# Patient Record
Sex: FEMALE | Race: WHITE | Employment: OTHER | URBAN - METROPOLITAN AREA
[De-identification: names, ages, dates, MRNs, and addresses within clinical notes are randomized per-mention and may not be internally consistent; named-entity substitution may affect disease eponyms.]

---

## 2017-10-11 PROBLEM — D50.9 MICROCYTIC ANEMIA: Status: ACTIVE | Noted: 2017-10-11

## 2017-10-11 PROBLEM — R55 SYNCOPE, CARDIOGENIC: Status: ACTIVE | Noted: 2017-10-11

## 2017-10-11 PROBLEM — I44.1 AV BLOCK, 2ND DEGREE: Status: ACTIVE | Noted: 2017-10-11

## 2018-05-02 ENCOUNTER — NURSE ONLY (OUTPATIENT)
Dept: NON INVASIVE DIAGNOSTICS | Age: 78
End: 2018-05-02
Payer: MEDICARE

## 2018-05-02 DIAGNOSIS — I44.1 AV BLOCK, 2ND DEGREE: ICD-10-CM

## 2018-05-02 DIAGNOSIS — Z95.0 PACEMAKER: Primary | Chronic | ICD-10-CM

## 2018-05-02 PROCEDURE — 93296 REM INTERROG EVL PM/IDS: CPT | Performed by: INTERNAL MEDICINE

## 2018-05-02 PROCEDURE — 93294 REM INTERROG EVL PM/LDLS PM: CPT | Performed by: INTERNAL MEDICINE

## 2018-06-01 ENCOUNTER — TELEPHONE (OUTPATIENT)
Dept: NON INVASIVE DIAGNOSTICS | Age: 78
End: 2018-06-01

## 2018-08-02 ENCOUNTER — OFFICE VISIT (OUTPATIENT)
Dept: NON INVASIVE DIAGNOSTICS | Age: 78
End: 2018-08-02
Payer: MEDICARE

## 2018-08-02 VITALS
BODY MASS INDEX: 27.7 KG/M2 | DIASTOLIC BLOOD PRESSURE: 76 MMHG | HEART RATE: 60 BPM | WEIGHT: 128.4 LBS | SYSTOLIC BLOOD PRESSURE: 114 MMHG | RESPIRATION RATE: 18 BRPM | HEIGHT: 57 IN

## 2018-08-02 DIAGNOSIS — Z95.0 PACEMAKER: Primary | Chronic | ICD-10-CM

## 2018-08-02 PROCEDURE — 99213 OFFICE O/P EST LOW 20 MIN: CPT | Performed by: INTERNAL MEDICINE

## 2018-08-02 PROCEDURE — 93280 PM DEVICE PROGR EVAL DUAL: CPT | Performed by: INTERNAL MEDICINE

## 2018-08-02 NOTE — PROGRESS NOTES
agitation. The patient is not nervous/anxious. PHYSICAL EXAM:  Vitals:    18 0955   BP: 114/76   Pulse: 60   Resp: 18   Weight: 128 lb 6.4 oz (58.2 kg)   Height: 4' 9\" (1.448 m)     Constitutional: Oriented to person, place, and time. Well-developed and cooperative. Head: Normocephalic and atraumatic. Eyes: Conjunctivae are normal.    Neck: No hepatojugular reflux and no JVD present. Carotid bruit is not present. Cardiovascular: S1 normal, S2 normal and intact distal pulses. A regular rhythm present. PMI is not displaced. Pulmonary/Chest: Effort normal and breath sounds normal. No respiratory distress. Abdominal: Soft. Normal appearance and bowel sounds are normal.   No tenderness. Musculoskeletal: Normal range of motion of all extremities, no muscle weakness. Neurological: Alert and oriented to person, place, and time. Gait normal.   Skin: Skin is warm and dry. No bruising, no ecchymosis and no rash noted. Extremity: No clubbing or cyanosis. No edema. Psychiatric: Normal mood and affect. Thought content normal.   Pacemaker site: stable, no sign of infection or erosion. Pertinent Labs:     TSH:   Lab Results   Component Value Date    TSH 1.100 10/11/2017         Xray: Xr Chest Standard (2 Vw)    Result Date: 10/10/2017  Patient MRN:  [id number] : 1940 Age: 68 years Gender: Female Order Date:  10/10/2017 5:40 PM EXAM: XR CHEST STANDARD TWO VW NUMBER OF VIEWS:  2 INDICATION:  syncope  COMPARISON: 8/10/2010 FINDINGS: The heart is mildly enlarged in size. The mediastinum is normal in width. There is a normal appearance to the pulmonary vasculature. No focal airspace opacity. There is no pleural effusion. There is no pneumothorax. Right axillary lymph node dissection     No airspace opacities or pleural effusion.  Mild stable cardiac megaly     Xr Hand Right (min 3 Views)    Result Date: 10/10/2017  Patient MRN:  [id number] : 1940 Age: 68 years Gender: Female Order Date: 10/10/2017 5:40 PM TECHNIQUE/NUMBER OF IMAGES/COMPARISON/CLINICAL HISTORY: X-ray series of the right hand, frontal, lateral and oblique projections. 3 images, 3 views. History: Trauma injury. FINDINGS: There is normal cortical bone contour and trabecular bone texture for all phalanxes of all fingers. Slight degree of soft tissue swelling is seen in the distal metacarpal area more prominent towards the radial side. All interphalangeal joints are preserved accounting some discrete degenerative changes mainly observed in the distal interphalangeal joints of the second and third fingers. All metacarpal joints are preserved, but there are minimal degenerative changes in the third metacarpal-phalangeal joint. No conspicuous acute fractures or dislocations are seen in the right hand. The radiocarpal, intercarpal and carpal-metacarpal joints are preserved. No fractures seen in the distal radius and ulna or in the carpal bones. Alignment of the carpal bones are overall preserved. No acute fractures or dislocations in the right hand. Soft tissue swelling in the distal metacarpal area. This is seen predominant towards the radial side. Ct Head Wo Contrast    Result Date: 10/10/2017  Patient MRN:  [id number] : 1940 Age: 68 years Gender: Female Order Date:  10/10/2017 6:26 PM EXAM: CT HEAD WO CONTRAST INDICATION:  occipital head injury  fall COMPARISON: None TECHNIQUE: Axial unenhanced CT scanning was performed through the head without the use of intravenous contrast. Low-dose CT  acquisition technique included one of following options: 1 . Automated exposure control 2. Adjustment of MA and or KV according to patient's size or 3. Use of iterative reconstruction. FINDINGS: No acute intracranial hemorrhage or evidence of acute edema. No abnormal extra-axial fluid collections. Ventricles are normal in size. Basilar cisterns are patent. Visualized paranasal sinuses and mastoid air cells are clear.  No depressed calvarial preserved. Disc spaces are well maintained. There is no evidence for bone or soft tissue encroachment of the lumbar spinal canal of a significant degree. Discrete posterior disc displacements are observed in L2-L3, at L4-L5, at L5-S1 and minimal in L3-L4. There is no spinal canal stenosis. There is no stenosis of the neural foramina despite far lateral extension of some of the posterior disc displaced. There are no acute fractures in the pedicles, spinous process or transverse process of the vertebrae of the lumbar spine. There are no fractures in the sacral wings. Incidentally noted is the presence of bilateral renal calculi. There are no signs for obstructive uropathy. The calculi are in the low subcentimeter sized range and do not cause obstruction. 1. Compression fracture deformity of T12 of undetermined age. See above comments. 2. No acute fractures or dislocation in the lumbar spine and visualized upper sacral spine including sacral wings. Mri Lumbar Spine Wo Contrast    Result Date: 10/12/2017  Patient MRN: [id number] : 1940 Age:  68 years Gender: Female Order Date: 10/11/2017 9:51 PM Exam: MRI LUMBAR SPINE WO CONTRAST Number of Views: 138 Indication:  Compression fracture of T12 Comparison: CT lumbar spine 10/10/2017. Findings: Age-indeterminate compression fracture deformity of what is designated as the T12 vertebral body with approximately 50 percent loss of vertebral body height but without evidence of STIR hyperintensity. There is a Schmorl's node at the superior aspect of the T12 endplate. Disc desiccation noted diffusely. Conus medullaris terminates at approximately the L1 vertebral body. Visualized portions of the liver, gallbladder, kidneys, aorta, adrenal glands, spleen and stomach are grossly unremarkable. T11-T12: Moderate right neural foraminal narrowing, otherwise, within normal limits.  T12-L1: No evidence of posterior disc contour abnormality, spinal canal stenosis, neural aortic root and ascending aorta.   No evidence for hemodynamically significant pericardial effusion.   Normal right ventricle structure and function based on repeat   measurements.      Signature      ----------------------------------------------------------------   Electronically signed by Kenyon Burnett(Interpreting   physician) on 10/12/2017 05:25 PM   ----------------------------------------------------------------     M-Mode/2D Measurements & Calculations      LV Diastolic     LV Systolic Dimension: 2 cm    AV Cusp Separation: 1.9   Dimension: 3.9   LV Volume Diastolic: 77 ml     cmAO Root Dimension: 3 cm   cm               LV Volume Systolic: 12 ml   LV YM:58.5 %     LV EDV/LV EDV Index: 77 EJ/06   LV PW Diastolic: MA/U^5LD ESV/LV ESV Index: 12   1.1 cm           ml/8ml/ m^2   LV PW Systolic:  EF Calculated: 84.4 %          LA/Aorta: 1.3   1.7 cm           LV Mass Index: 100 l/min*m^2   Septum                                          LA volume/Index: 48.9 ml   Diastolic: 1.2                                  /55.62ml/m^2   cm               LVOT: 2 cm   Septum Systolic:   1.1 cm      LV Mass: 149.54   g     Doppler Measurements & Calculations      MV Peak E-Wave: 0.54 AV Peak Velocity:     LVOT Peak Velocity: 1.48 m/s   m/s                  1.74 m/s              LVOT Mean Velocity: 1.08 m/s   MV Peak A-Wave: 0.68 AV Peak Gradient:     LVOT Peak Gradient: 8.8   m/s                  12.15 mmHg            mmHgLVOT Mean Gradient: 5.3   MV E/A Ratio: 0.78   AV Mean Velocity:     mmHg   MV Peak Gradient: 2  1.24 m/s   mmHg                 AV Mean Gradient: 6.9   MV Mean Gradient:    mmHg   1.2 mmHg             AV VTI: 30.8 cm       TR Velocity:2.56 m/s   MV Mean Velocity:    AV Area               TR Gradient:26.21 mmHg   0.52 m/s             (Continuity):2.75     PV Peak Velocity: 0.91 m/s   MV Deceleration      cm^2                  PV Peak Gradient: 3.33 mmHg   Time: 99.7 msec                            PV Mean Velocity: 0.74 m/s   MV P1/2t: 36.3 msec  LVOT VTI: 27 cm       PV Mean Gradient: 2.3 mmHg   MVA by PHT:6.06 cm^2 IVRT: 115.3 msec   MV Area   (continuity): 5.5   cm^2   MV E' Septal   Velocity: 0.03 m/s   MV E' Lateral   Velocity: 0.04 m/s            24 Holter monitor 7/24/17  - Second degree Mobitz II AV block, PVCs, no VT or AF. There was no diary submitted or patient symptoms. Device Interrogation/Reprogramming (08/02/18)  Make/Model BSCI Accolade MRI EL, model #: [id number], serial #: [id number]. DOI 10/12/17  Mode DDDR 60/130 ppm  P wave: 2.6 mV  Impedance: 616 ohms   Threshold: 1.0V @ 0.5 ms  RV R wave: 12.2 mV  Impedance: 658 ohms   Threshold: 0.9 V @ 0.5 ms  Pacing: A: 61%%  RV: 51%     Battery Voltage/Longevity: 13.5 Years    Arrhythmias: AF [place] <1%; Total 1.6 minutes. Overall device function is normal  All device programmable settings were evaluated per above and in the scanned document, along with iterative adjustments (capture thresholds) to assess and select the most appropriate final programming to provide for consistent delivery of the appropriate therapy and to verify function of the device. I have independently reviewed all of the ECGs as per above. I have reviewed all progress notes & records from the time of the patient's last office visit up to present.     Impression:     1. Symptomatic Mobitz II AV block  - recurrent falls/syncope  - 24 hour Holter monitor 7/24/17: transient second degree HB, Type II - no symptoms at that time per patient, no diary was submitted  - on no AV meme blocking agents  - 2D echocardiogram above  - no clear reversible causes and given these abrupt episodes of syncope the most likely cause is paroxysmal AV block as noted on numerous monitors now  - cLBBB, EF 75%  - s/p dual chamber PPM 10/12/17     2. H/O breast CA  - right-sided     3. HLD     4. GERD     5. Dual chamber PPM  -  BSCI Accolade MRI EL, model #: [id number], serial #: [id number].  DOI 10/12/17 Recommendations:     1. Ms. Barbi Colunga pacemaker function is normal and programmed accordingly. 2. She will followed remotely every 91 days. 3. Office follow-up in 1 year per her request.  4. No changes were made in her medications. I have spent a total of 15 minutes with the patient and his/her family reviewing the above stated recommendations. A total of >50% of that time involved face-to-face time providing counseling and or coordination of care with the other providers. Thank you for allowing me to participate in your patient's care.     Cornelia Caceres DO  [facility]  [address] [facility]

## 2018-11-07 ENCOUNTER — NURSE ONLY (OUTPATIENT)
Dept: NON INVASIVE DIAGNOSTICS | Age: 78
End: 2018-11-07
Payer: MEDICARE

## 2018-11-07 DIAGNOSIS — I44.1 AV BLOCK, 2ND DEGREE: ICD-10-CM

## 2018-11-07 DIAGNOSIS — Z95.0 PACEMAKER: Primary | Chronic | ICD-10-CM

## 2018-11-07 PROCEDURE — 93296 REM INTERROG EVL PM/IDS: CPT | Performed by: INTERNAL MEDICINE

## 2018-11-07 PROCEDURE — 93294 REM INTERROG EVL PM/LDLS PM: CPT | Performed by: INTERNAL MEDICINE

## 2018-11-13 ENCOUNTER — TELEPHONE (OUTPATIENT)
Dept: NON INVASIVE DIAGNOSTICS | Age: 78
End: 2018-11-13

## 2018-11-13 NOTE — TELEPHONE ENCOUNTER
We have received your remote transmission. Our staff will contact you if there is anything that needs to be discussed. Your next appointment is 02/06/2019 remote transmission from home.

## 2019-02-06 ENCOUNTER — NURSE ONLY (OUTPATIENT)
Dept: NON INVASIVE DIAGNOSTICS | Age: 79
End: 2019-02-06
Payer: MEDICARE

## 2019-02-06 DIAGNOSIS — R55 SYNCOPE AND COLLAPSE: ICD-10-CM

## 2019-02-06 DIAGNOSIS — Z95.0 PACEMAKER: Primary | Chronic | ICD-10-CM

## 2019-02-06 DIAGNOSIS — I44.1 AV BLOCK, 2ND DEGREE: ICD-10-CM

## 2019-02-06 PROCEDURE — 93294 REM INTERROG EVL PM/LDLS PM: CPT | Performed by: INTERNAL MEDICINE

## 2019-02-06 PROCEDURE — 93296 REM INTERROG EVL PM/IDS: CPT | Performed by: INTERNAL MEDICINE

## 2019-02-11 ENCOUNTER — TELEPHONE (OUTPATIENT)
Dept: NON INVASIVE DIAGNOSTICS | Age: 79
End: 2019-02-11

## 2019-05-08 ENCOUNTER — NURSE ONLY (OUTPATIENT)
Dept: NON INVASIVE DIAGNOSTICS | Age: 79
End: 2019-05-08
Payer: MEDICARE

## 2019-05-08 DIAGNOSIS — I44.1 AV BLOCK, 2ND DEGREE: ICD-10-CM

## 2019-05-08 DIAGNOSIS — Z95.0 PACEMAKER: Primary | ICD-10-CM

## 2019-05-08 PROCEDURE — 93296 REM INTERROG EVL PM/IDS: CPT | Performed by: INTERNAL MEDICINE

## 2019-05-08 PROCEDURE — 93294 REM INTERROG EVL PM/LDLS PM: CPT | Performed by: INTERNAL MEDICINE

## 2019-05-24 ENCOUNTER — TELEPHONE (OUTPATIENT)
Dept: NON INVASIVE DIAGNOSTICS | Age: 79
End: 2019-05-24

## 2019-05-24 NOTE — TELEPHONE ENCOUNTER
We have received your remote transmission. Our staff will contact you if there is anything that needs to be discussed. Your next appointment is 08/07/2019 remote transmission from home    Pt is wireless.

## 2019-05-24 NOTE — TELEPHONE ENCOUNTER
----- Message from Tamica Tian RN sent at 5/24/2019  2:09 PM EDT -----  Successful transmission received. Please call patient and give next appointment.

## 2019-05-24 NOTE — PROGRESS NOTES
See [employer] [place] report. Remote monitoring reviewed over a 90 day period. End of 90 day monitoring period date of service 5.8.2019.

## 2019-07-31 NOTE — PROGRESS NOTES
Cardiac Electrophysiology Progress Note    Talha Wilder  1940  Date of Service: 8/1/2019  PCP: Alysia Oswald MD  Electrophysiologist: Emerson Barros DO        Subjective: Talha Wilder presents for  management of: Mobitz type II AVB, pacemaker in situ. She presents for her one year in-office follow-up. She is compliant with remote follow-up. Brittanie Tracy is feeling well and offers no complaints. She denies angina, dyspnea, syncope, orthopnea and PND. Patient Active Problem List   Diagnosis    Osteoporosis    Malignant neoplasm of right female breast ([address]    Vitamin D deficiency    Radiculopathy    AV block, 2nd degree    Syncope, cardiogenic    Microcytic anemia    Syncope and collapse    Pacemaker     Current Outpatient Medications on File Prior to Visit   Medication Sig Dispense Refill    vitamin D (CHOLECALCIFEROL) 1000 UNIT TABS tablet Take 1,000 Units by mouth daily      calcium carbonate (OSCAL) 500 MG TABS tablet Take 500 mg by mouth three times daily       No current facility-administered medications on file prior to visit. No Known Allergies      Review of Systems   Respiratory: Negative for chest tightness and shortness of breath. Cardiovascular: Negative for chest pain, palpitations and leg swelling. Neurological: Negative for dizziness, syncope and light-headedness. All other systems reviewed and are negative. PHYSICAL EXAM:  Vitals:    08/01/19 0956   BP: 128/68   Pulse: 70   Resp: 16   Weight: 127 lb (57.6 kg)   Height: 4' 9\" (1.448 m)     Constitutional: Oriented to person, place, and time. Well-developed and cooperative. Head: Normocephalic and atraumatic. Eyes: Conjunctivae are normal.   Neck: No hepatojugular reflux and no JVD present. Cardiovascular: S1 normal, S2 normal and intact distal pulses. Normal rate and rhythm. PMI is not displaced. Pulmonary/Chest: Effort normal and breath sounds normal. No respiratory distress. Abdominal: Soft.  No tenderness. Musculoskeletal: Normal range of motion of all extremities, no muscle weakness. Neurological: Alert and oriented to person, place, and time. Gait normal.   Skin: Skin is warm and dry. No bruising, no ecchymosis and no rash noted. Extremity: No clubbing or cyanosis. No edema. Psychiatric: Normal mood and affect. Thought content normal.   Pacemaker site: stable, well healed, no evidence of erosion    Pertinent Labs:     TSH:   Lab Results   Component Value Date    TSH 1.100 10/11/2017         Xray: Xr Chest Standard (2 Vw)    Result Date: 10/10/2017  Patient MRN:  [id number] : 1940 Age: 68 years Gender: Female Order Date:  10/10/2017 5:40 PM EXAM: XR CHEST STANDARD TWO VW NUMBER OF VIEWS:  2 INDICATION:  syncope  COMPARISON: 8/10/2010 FINDINGS: The heart is mildly enlarged in size. The mediastinum is normal in width. There is a normal appearance to the pulmonary vasculature. No focal airspace opacity. There is no pleural effusion. There is no pneumothorax. Right axillary lymph node dissection     No airspace opacities or pleural effusion. Mild stable cardiac megaly     Xr Hand Right (min 3 Views)    Result Date: 10/10/2017  Patient MRN:  [id number] : 1940 Age: 68 years Gender: Female Order Date:  10/10/2017 5:40 PM TECHNIQUE/NUMBER OF IMAGES/COMPARISON/CLINICAL HISTORY: X-ray series of the right hand, frontal, lateral and oblique projections. 3 images, 3 views. History: Trauma injury. FINDINGS: There is normal cortical bone contour and trabecular bone texture for all phalanxes of all fingers. Slight degree of soft tissue swelling is seen in the distal metacarpal area more prominent towards the radial side. All interphalangeal joints are preserved accounting some discrete degenerative changes mainly observed in the distal interphalangeal joints of the second and third fingers.  All metacarpal joints are preserved, but there are minimal degenerative changes in the third metacarpal-phalangeal EDV Index: 77 GK/16   LV PW Diastolic: AL/M^3RV ESV/LV ESV Index: 12   1.1 cm           ml/8ml/ m^2   LV PW Systolic:  EF Calculated: 84.4 %          LA/Aorta: 1.3   1.7 cm           LV Mass Index: 100 l/min*m^2   Septum                                          LA volume/Index: 71.2 ml   Diastolic: 1.2                                  /55.62ml/m^2   cm               LVOT: 2 cm   Septum Systolic:   1.7 cm      LV Mass: 149.54   g     Doppler Measurements & Calculations      MV Peak E-Wave: 0.54 AV Peak Velocity:     LVOT Peak Velocity: 1.48 m/s   m/s                  1.74 m/s              LVOT Mean Velocity: 1.08 m/s   MV Peak A-Wave: 0.68 AV Peak Gradient:     LVOT Peak Gradient: 8.8   m/s                  12.15 mmHg            mmHgLVOT Mean Gradient: 5.3   MV E/A Ratio: 0.78   AV Mean Velocity:     mmHg   MV Peak Gradient: 2  1.24 m/s   mmHg                 AV Mean Gradient: 6.9   MV Mean Gradient:    mmHg   1.2 mmHg             AV VTI: 30.8 cm       TR Velocity:2.56 m/s   MV Mean Velocity:    AV Area               TR Gradient:26.21 mmHg   0.52 m/s             (Continuity):2.75     PV Peak Velocity: 0.91 m/s   MV Deceleration      cm^2                  PV Peak Gradient: 3.33 mmHg   Time: 99.7 msec                            PV Mean Velocity: 0.74 m/s   MV P1/2t: 36.3 msec  LVOT VTI: 27 cm       PV Mean Gradient: 2.3 mmHg   MVA by PHT:6.06 cm^2 IVRT: 115.3 msec   MV Area   (continuity): 5.5   cm^2   MV E' Septal   Velocity: 0.03 m/s   MV E' Lateral   Velocity: 0.04 m/s            24 Holter monitor 7/24/17  - Second degree Mobitz II AV block, PVCs, no VT or AF. There was no diary submitted or patient symptoms. Device Interrogation/Reprogramming (08/01/19)  Make/Model BSCI Accolade MRI EL, model #: [id number], serial #: [id number].  DOI 10/12/17  Mode DDDR 60/130 ppm  P wave: 1.9mV  Impedance: 729 ohms   Threshold: 0.7V @ 0.5 ms  RV R wave: 11.3 mV  Impedance: 590 ohms   Threshold: 1.2 V @ 0.5 ms  Pacing: A: 4861%%  RV: 58% Battery Voltage/Longevity: 12.5 Years    Arrhythmias: AF [place] <1%; 5 sec   Overall device function is normal  All device programmable settings were evaluated per above and in the scanned document, along with iterative adjustments (capture thresholds) to assess and select the most appropriate final programming to provide for consistent delivery of the appropriate therapy and to verify function of the device. I have independently reviewed all of the ECGs as per above. I have reviewed all progress notes & records from the time of the patient's last office visit up to present.     Impression:     1. Symptomatic Mobitz II AV block  - recurrent falls/syncope  - 24 hour Holter monitor 7/24/17: transient second degree HB, Type II - no symptoms at that time per patient, no diary was submitted  - on no AV meme blocking agents  - 2D echocardiogram above  - no clear reversible causes and given these abrupt episodes of syncope the most likely cause is paroxysmal AV block as noted on numerous monitors now  - cLBBB, EF 75%  - s/p dual chamber PPM 10/12/17     2. H/O breast CA  - right-sided     3. HLD     4. GERD     5. Dual chamber PPM  -  BSCI Accolade MRI EL, model #: [id number], serial #: [id number]. DOI 10/12/17      Recommendations:     1. Normal pacemaker function and programming based on the above interrogation. There have been no significant arrhythmias. AF burden <1%. 2. No changes were made in her medications today. 3. She will continue to send remote transmissions Q91 days x3 followed by a yearly in-office follow-up. 4. No changes were made in her medications. 5. She was asked to call the office with concerns prior to follow-up. Thank you for allowing me to participate in their care. I have spent a total of 20 minutes with the patient and her family reviewing the above stated recommendations.  And a total of >50% of that time involved face-to-face time providing counseling and or coordination of care

## 2019-08-01 ENCOUNTER — OFFICE VISIT (OUTPATIENT)
Dept: NON INVASIVE DIAGNOSTICS | Age: 79
End: 2019-08-01
Payer: MEDICARE

## 2019-08-01 VITALS
HEIGHT: 57 IN | WEIGHT: 127 LBS | RESPIRATION RATE: 16 BRPM | BODY MASS INDEX: 27.4 KG/M2 | HEART RATE: 70 BPM | SYSTOLIC BLOOD PRESSURE: 128 MMHG | DIASTOLIC BLOOD PRESSURE: 68 MMHG

## 2019-08-01 DIAGNOSIS — Z95.0 PACEMAKER: Primary | ICD-10-CM

## 2019-08-01 PROCEDURE — 99213 OFFICE O/P EST LOW 20 MIN: CPT | Performed by: NURSE PRACTITIONER

## 2019-08-01 PROCEDURE — 93280 PM DEVICE PROGR EVAL DUAL: CPT | Performed by: NURSE PRACTITIONER

## 2019-08-01 ASSESSMENT — ENCOUNTER SYMPTOMS
CHEST TIGHTNESS: 0
SHORTNESS OF BREATH: 0

## 2019-08-07 ENCOUNTER — NURSE ONLY (OUTPATIENT)
Dept: NON INVASIVE DIAGNOSTICS | Age: 79
End: 2019-08-07
Payer: MEDICARE

## 2019-08-07 DIAGNOSIS — I44.1 AV BLOCK, 2ND DEGREE: ICD-10-CM

## 2019-08-07 DIAGNOSIS — Z95.0 PACEMAKER: Primary | ICD-10-CM

## 2019-08-07 PROCEDURE — 93294 REM INTERROG EVL PM/LDLS PM: CPT | Performed by: INTERNAL MEDICINE

## 2019-08-07 PROCEDURE — 93296 REM INTERROG EVL PM/IDS: CPT | Performed by: INTERNAL MEDICINE

## 2019-08-22 ENCOUNTER — TELEPHONE (OUTPATIENT)
Dept: NON INVASIVE DIAGNOSTICS | Age: 79
End: 2019-08-22

## 2019-11-06 ENCOUNTER — NURSE ONLY (OUTPATIENT)
Dept: NON INVASIVE DIAGNOSTICS | Age: 79
End: 2019-11-06
Payer: MEDICARE

## 2019-11-06 DIAGNOSIS — Z95.0 PACEMAKER: Primary | ICD-10-CM

## 2019-11-06 DIAGNOSIS — I44.1 AV BLOCK, 2ND DEGREE: ICD-10-CM

## 2019-11-06 PROCEDURE — 93294 REM INTERROG EVL PM/LDLS PM: CPT | Performed by: INTERNAL MEDICINE

## 2019-11-06 PROCEDURE — 93296 REM INTERROG EVL PM/IDS: CPT | Performed by: INTERNAL MEDICINE

## 2019-11-20 ENCOUNTER — TELEPHONE (OUTPATIENT)
Dept: NON INVASIVE DIAGNOSTICS | Age: 79
End: 2019-11-20

## 2020-02-05 ENCOUNTER — NURSE ONLY (OUTPATIENT)
Dept: NON INVASIVE DIAGNOSTICS | Age: 80
End: 2020-02-05
Payer: MEDICARE

## 2020-02-05 PROCEDURE — 93296 REM INTERROG EVL PM/IDS: CPT | Performed by: INTERNAL MEDICINE

## 2020-02-05 PROCEDURE — 93294 REM INTERROG EVL PM/LDLS PM: CPT | Performed by: INTERNAL MEDICINE

## 2020-02-12 NOTE — PROGRESS NOTES
See [employer] [place] report. Remote monitoring reviewed over a 90 day period. End of 90 day monitoring period date of service 2.5.2020.

## 2020-05-07 ENCOUNTER — NURSE ONLY (OUTPATIENT)
Dept: NON INVASIVE DIAGNOSTICS | Age: 80
End: 2020-05-07
Payer: MEDICARE

## 2020-05-07 PROCEDURE — 93294 REM INTERROG EVL PM/LDLS PM: CPT | Performed by: INTERNAL MEDICINE

## 2020-05-07 PROCEDURE — 93296 REM INTERROG EVL PM/IDS: CPT | Performed by: INTERNAL MEDICINE

## 2020-06-15 ENCOUNTER — TELEPHONE (OUTPATIENT)
Dept: NON INVASIVE DIAGNOSTICS | Age: 80
End: 2020-06-15

## 2020-06-15 NOTE — TELEPHONE ENCOUNTER
Patient has [facility] drug coverage. Both Eliquis and Xarelto 30 day (local) $102.00 and 90 day (mail) $144.00. Patient has $60.00 deductible still to meet, once met her price will go down.

## 2020-06-15 NOTE — TELEPHONE ENCOUNTER
Spoke with Estee and patient's sister Bill Glover. Reviewed Dr. Shanice Koroma recommendation. They agree to have blood work done. They would like to have a virtual visit to speak with Dr. Mary Kate Ghosh.      Zenon Saini

## 2020-06-15 NOTE — TELEPHONE ENCOUNTER
Reviewed--appears to be in AF/AFl. Based on her [id number] score OAC would be recommended. It appears she hasn't had blood work in a while and would need a BMP, CBC, and TSH. Once I know her Cr I could recommend dosing of Eliquis or Xarelto(prob will need to check cost) or coumadin. We can also set her up for a VV if she wants to discuss further. Clearly if she remains in AF/Fl we can consider CV down the road.  Thx.    ALK

## 2020-06-17 ASSESSMENT — ENCOUNTER SYMPTOMS
CHEST TIGHTNESS: 0
SHORTNESS OF BREATH: 0

## 2020-06-17 NOTE — PROGRESS NOTES
TECHNIQUE/NUMBER OF IMAGES/COMPARISON/CLINICAL HISTORY: CT lumbar spine: Sequential axial images were obtained with sagittal reconstructions provided. Total number of images: 700. History: Trauma, injury. 66-year-old female patient. FINDINGS: There is a compression fracture deformity of T12. There is no indication for a significant degree of soft tissue edema or hemorrhage around this vertebral body which can indicate that this is more an old event. Correlation with clinical data is recommended. If this is needed for precise age determination MRI would be in a better advantage as it can investigate for bone marrow edema or contusion. The lumbar vertebral bodies have a normal height. The alignment is preserved. Disc spaces are well maintained. There is no evidence for bone or soft tissue encroachment of the lumbar spinal canal of a significant degree. Discrete posterior disc displacements are observed in L2-L3, at L4-L5, at L5-S1 and minimal in L3-L4. There is no spinal canal stenosis. There is no stenosis of the neural foramina despite far lateral extension of some of the posterior disc displaced. There are no acute fractures in the pedicles, spinous process or transverse process of the vertebrae of the lumbar spine. There are no fractures in the sacral wings. Incidentally noted is the presence of bilateral renal calculi. There are no signs for obstructive uropathy. The calculi are in the low subcentimeter sized range and do not cause obstruction. 1. Compression fracture deformity of T12 of undetermined age. See above comments. 2. No acute fractures or dislocation in the lumbar spine and visualized upper sacral spine including sacral wings.      Mri Lumbar Spine Wo Contrast    Result Date: 10/12/2017  Patient MRN: [id number] : 1940 Age:  68 years Gender: Female Order Date: 10/11/2017 9:51 PM Exam: MRI LUMBAR SPINE WO CONTRAST Number of Views: 138 Indication:  Compression fracture of T12 Comparison: CT lumbar spine 10/10/2017. Findings: Age-indeterminate compression fracture deformity of what is designated as the T12 vertebral body with approximately 50 percent loss of vertebral body height but without evidence of STIR hyperintensity. There is a Schmorl's node at the superior aspect of the T12 endplate. Disc desiccation noted diffusely. Conus medullaris terminates at approximately the L1 vertebral body. Visualized portions of the liver, gallbladder, kidneys, aorta, adrenal glands, spleen and stomach are grossly unremarkable. T11-T12: Moderate right neural foraminal narrowing, otherwise, within normal limits. T12-L1: No evidence of posterior disc contour abnormality, spinal canal stenosis, neural foraminal narrowing or spinal nerve root abutment/impingement. . L1-L2: No evidence of posterior disc contour abnormality, spinal canal stenosis, neural foraminal narrowing or spinal nerve root abutment/impingement. . L2-L3: Mild circumferential disc bulge. Mild bilateral neural foraminal narrowing. No evidence of spinal canal stenosis or spinal nerve abutment/impingement. L3-L4: Mild circumferential disc bulge. Mild bilateral neural foraminal narrowing. No evidence of spinal canal stenosis or spinal nerve abutment/impingement. L4-L5: Mild circumferential disc bulge. Mild bilateral neural foraminal narrowing. Mild bilateral facet osteoarthropathy. No evidence of spinal canal stenosis or spinal nerve abutment/impingement. L5-S1: Mild circumferential disc bulge. Mild left and moderate right neural foraminal narrowing. Moderate bilateral facet osteoarthropathy. No evidence of spinal canal stenosis or spinal nerve abutment/impingement. IMPRESSION:  1. Chronic age-indeterminate compression fracture deformity at T12 with Schmorl's node in the superior endplate. 2. Multilevel degenerative disc disease and facet osteoarthropathy, see above for level by level details, extending at T11-T12 and L2 S1 motion segment levels.      Pertinent                  1.74 m/s              LVOT Mean Velocity: 1.08 m/s   MV Peak A-Wave: 0.68 AV Peak Gradient:     LVOT Peak Gradient: 8.8   m/s                  12.15 mmHg            mmHgLVOT Mean Gradient: 5.3   MV E/A Ratio: 0.78   AV Mean Velocity:     mmHg   MV Peak Gradient: 2  1.24 m/s   mmHg                 AV Mean Gradient: 6.9   MV Mean Gradient:    mmHg   1.2 mmHg             AV VTI: 30.8 cm       TR Velocity:2.56 m/s   MV Mean Velocity:    AV Area               TR Gradient:26.21 mmHg   0.52 m/s             (Continuity):2.75     PV Peak Velocity: 0.91 m/s   MV Deceleration      cm^2                  PV Peak Gradient: 3.33 mmHg   Time: 99.7 msec                            PV Mean Velocity: 0.74 m/s   MV P1/2t: 36.3 msec  LVOT VTI: 27 cm       PV Mean Gradient: 2.3 mmHg   MVA by PHT:6.06 cm^2 IVRT: 115.3 msec   MV Area   (continuity): 5.5   cm^2   MV E' Septal   Velocity: 0.03 m/s   MV E' Lateral   Velocity: 0.04 m/s            24 Holter monitor 7/24/17  - Second degree Mobitz II AV block, PVCs, no VT or AF. There was no diary submitted or patient symptoms. Remote Device Transmission--6/14/20: Normal device function. In AF/Fl since 6/11/20. Battery longevity: 10.5 years. Full report in Epic. I have independently reviewed all of the ECGs as per above. I have reviewed all progress notes & records from the time of the patient's last office visit up to present.     Impression:     1. Symptomatic Mobitz II AV block  - recurrent falls/syncope  - 24 hour Holter monitor 7/24/17: transient second degree HB, Type II - no symptoms at that time per patient, no diary was submitted  - on no AV meme blocking agents  - 2D echocardiogram above  - no clear reversible causes and given these abrupt episodes of syncope the most likely cause is paroxysmal AV block as noted on numerous monitors now  - cLBBB, EF 75%  - s/p dual chamber PPM 10/12/17     2. H/O breast CA  - right-sided     3. HLD     4. GERD     5.  Dual chamber PPM  -  BSCI Accolade MRI EL, model #: [id number], serial #: [id number]. DOI 10/12/17    6. Atrial fibrillation  - new onsent-->6/11/20  - [id number]: 3  - Rate Control: None. - Rhythm Control: None. - OAC: None at present  - BMI: 27.5 kg/m2  - HbA1c: No results found for: LABA1C  No results found for: EAG   - Sleep apnea: Not tested.  - RF: Age, female     Recommendations:     Ms. Nova Johansen was found to be in AF of new onset on 6/11/20. Her pacemaker function is stable and programmed accordingly based on the above device interrogation. Today we discussed the indication for [address] based on her [id number] score. We discussed Xarelto, Eliquis and warfarin. The cost's of Eliquis and Xarelto have already been reviewed with her. After our discussion, she has opted for Eliquis. Based on her age and weight, her dose of Eliquis will be 2.5 mg BID. We also discussed that if she stays in AF, then we would plan for CV after she has been on her [address] uninterrupted for 3-4 weeks. We will have her send a remote transmission in 4 weeks. I made no other changes in her medications. I have spent a total of 30 minutes with the patient via a telephone visit reviewing the above stated recommendations. A total of >50% of that time involved face-to-face time providing counseling and or coordination of care with the other providers    Riki Nicolas DO  [facility] Cardiac Electrophysiology  [address] [facility]    Due to this being a TeleHealth encounter, evaluation of organ systems is limited. Pursuant to the emergency declaration under the [address] waiver authority and the [employer] Act, this telephone visit was conducted, with patient's consent, to reduce the patient's risk of exposure to COVID-19 and provide continuity of care for an established patient.     Services were provided through a telephone discussion to substitute for in-person clinic visit.

## 2020-06-18 ENCOUNTER — TELEPHONE (OUTPATIENT)
Dept: NON INVASIVE DIAGNOSTICS | Age: 80
End: 2020-06-18

## 2020-06-18 ENCOUNTER — VIRTUAL VISIT (OUTPATIENT)
Dept: NON INVASIVE DIAGNOSTICS | Age: 80
End: 2020-06-18
Payer: MEDICARE

## 2020-06-18 PROCEDURE — 99443 PR PHYS/QHP TELEPHONE EVALUATION 21-30 MIN: CPT | Performed by: INTERNAL MEDICINE

## 2020-08-12 ENCOUNTER — NURSE ONLY (OUTPATIENT)
Dept: NON INVASIVE DIAGNOSTICS | Age: 80
End: 2020-08-12
Payer: MEDICARE

## 2020-08-12 PROCEDURE — 93294 REM INTERROG EVL PM/LDLS PM: CPT | Performed by: INTERNAL MEDICINE

## 2020-08-12 PROCEDURE — 93296 REM INTERROG EVL PM/IDS: CPT | Performed by: INTERNAL MEDICINE

## 2020-08-14 NOTE — PROGRESS NOTES
See [facility] [facility] report. Remote monitoring reviewed over a 90 day period. End of 90 day monitoring period date of service 8-. Pending DCCV per Dr. Toby Cai last note. Remains in AF.      Pablito Noyola RN, BSN  Kh[place]

## 2020-08-17 ENCOUNTER — TELEPHONE (OUTPATIENT)
Dept: NON INVASIVE DIAGNOSTICS | Age: 80
End: 2020-08-17

## 2020-08-17 NOTE — TELEPHONE ENCOUNTER
Spoke with patient's niece let her know [address] [place] would like patient to have CV due to AF and also have Echo and LexiScan, let her know dept will contact them to schedule. She is agreeable and verbalized understanding.

## 2020-08-17 NOTE — TELEPHONE ENCOUNTER
----- Message from Cynthia Peterson RN sent at 8/14/2020  2:17 PM EDT -----  Pending DCCV per [facility] note, still in AF on remote on 8-.  NICK

## 2020-08-18 NOTE — TELEPHONE ENCOUNTER
Spoke with patient's niece Licha Donisak let her know patient is scheduled for CV on 08/28/2020 at 9:30, they are to arrive at main at 7:30. Nothing to eat or drink after midnight, patient to have  both wear a mask. Patient going for COVID testing at [facility] on 08/24/2020 then to self isolate until morning of procedure. She verbalized understanding.

## 2020-08-24 ENCOUNTER — HOSPITAL ENCOUNTER (OUTPATIENT)
Age: 80
Discharge: HOME | End: 2020-08-26
Payer: MEDICARE

## 2020-08-24 PROCEDURE — U0003 INFECTIOUS AGENT DETECTION BY NUCLEIC ACID (DNA OR RNA); SEVERE ACUTE RESPIRATORY SYNDROME CORONAVIRUS 2 (SARS-COV-2) (CORONAVIRUS DISEASE [COVID-19]), AMPLIFIED PROBE TECHNIQUE, MAKING USE OF HIGH THROUGHPUT TECHNOLOGIES AS DESCRIBED BY CMS-2020-01-R: HCPCS

## 2020-08-26 LAB
SARS-COV-2: NOT DETECTED
SOURCE: NORMAL

## 2020-08-28 ENCOUNTER — ANESTHESIA (OUTPATIENT)
Dept: CARDIAC CATH/INVASIVE PROCEDURES | Age: 80
End: 2020-08-28

## 2020-08-28 ENCOUNTER — HOSPITAL ENCOUNTER (OUTPATIENT)
Dept: CARDIAC CATH/INVASIVE PROCEDURES | Age: 80
Discharge: HOME | End: 2020-08-28
Payer: MEDICARE

## 2020-08-28 ENCOUNTER — ANESTHESIA EVENT (OUTPATIENT)
Dept: CARDIAC CATH/INVASIVE PROCEDURES | Age: 80
End: 2020-08-28

## 2020-08-28 VITALS
DIASTOLIC BLOOD PRESSURE: 71 MMHG | BODY MASS INDEX: 27.4 KG/M2 | TEMPERATURE: 97.9 F | HEART RATE: 66 BPM | WEIGHT: 127 LBS | OXYGEN SATURATION: 99 % | SYSTOLIC BLOOD PRESSURE: 114 MMHG | RESPIRATION RATE: 18 BRPM | HEIGHT: 57 IN

## 2020-08-28 VITALS — SYSTOLIC BLOOD PRESSURE: 117 MMHG | DIASTOLIC BLOOD PRESSURE: 76 MMHG | OXYGEN SATURATION: 99 %

## 2020-08-28 LAB
ANION GAP SERPL CALCULATED.3IONS-SCNC: 13 MMOL/L (ref 7–16)
BUN BLDV-MCNC: 21 MG/DL (ref 8–23)
CALCIUM SERPL-MCNC: 9.4 MG/DL (ref 8.6–10.2)
CHLORIDE BLD-SCNC: 104 MMOL/L (ref 98–107)
CO2: 23 MMOL/L (ref 22–29)
CREAT SERPL-MCNC: 0.7 MG/DL (ref 0.5–1)
GFR AFRICAN AMERICAN: >60
GFR NON-AFRICAN AMERICAN: >60 ML/MIN/1.73
GLUCOSE BLD-MCNC: 97 MG/DL (ref 74–99)
HCT VFR BLD CALC: 39.9 % (ref 34–48)
HEMOGLOBIN: 12.2 G/DL (ref 11.5–15.5)
MAGNESIUM: 2.1 MG/DL (ref 1.6–2.6)
MCH RBC QN AUTO: 19.7 PG (ref 26–35)
MCHC RBC AUTO-ENTMCNC: 30.6 % (ref 32–34.5)
MCV RBC AUTO: 64.6 FL (ref 80–99.9)
PDW BLD-RTO: 15.5 FL (ref 11.5–15)
PLATELET # BLD: 258 E9/L (ref 130–450)
PMV BLD AUTO: 10.8 FL (ref 7–12)
POTASSIUM SERPL-SCNC: 4.3 MMOL/L (ref 3.5–5)
RBC # BLD: 6.18 E12/L (ref 3.5–5.5)
SODIUM BLD-SCNC: 140 MMOL/L (ref 132–146)
WBC # BLD: 5.8 E9/L (ref 4.5–11.5)

## 2020-08-28 PROCEDURE — 92960 CARDIOVERSION ELECTRIC EXT: CPT | Performed by: INTERNAL MEDICINE

## 2020-08-28 PROCEDURE — 80048 BASIC METABOLIC PNL TOTAL CA: CPT

## 2020-08-28 PROCEDURE — 93005 ELECTROCARDIOGRAM TRACING: CPT

## 2020-08-28 PROCEDURE — 99215 OFFICE O/P EST HI 40 MIN: CPT | Performed by: INTERNAL MEDICINE

## 2020-08-28 PROCEDURE — 2709999900 HC NON-CHARGEABLE SUPPLY

## 2020-08-28 PROCEDURE — 92960 CARDIOVERSION ELECTRIC EXT: CPT

## 2020-08-28 PROCEDURE — 83735 ASSAY OF MAGNESIUM: CPT

## 2020-08-28 PROCEDURE — 3700000000 HC ANESTHESIA ATTENDED CARE

## 2020-08-28 PROCEDURE — 2580000003 HC RX 258: Performed by: ANESTHESIOLOGIST ASSISTANT

## 2020-08-28 PROCEDURE — 93288 INTERROG EVL PM/LDLS PM IP: CPT | Performed by: INTERNAL MEDICINE

## 2020-08-28 PROCEDURE — 36415 COLL VENOUS BLD VENIPUNCTURE: CPT

## 2020-08-28 PROCEDURE — 6360000002 HC RX W HCPCS: Performed by: ANESTHESIOLOGIST ASSISTANT

## 2020-08-28 PROCEDURE — 85027 COMPLETE CBC AUTOMATED: CPT

## 2020-08-28 RX ORDER — PROPOFOL 10 MG/ML
INJECTION, EMULSION INTRAVENOUS PRN
Status: DISCONTINUED | OUTPATIENT
Start: 2020-08-28 | End: 2020-08-28 | Stop reason: SDUPTHER

## 2020-08-28 RX ORDER — METOPROLOL SUCCINATE 25 MG/1
25 TABLET, EXTENDED RELEASE ORAL DAILY
Qty: 30 TABLET | Refills: 3 | Status: SHIPPED | OUTPATIENT
Start: 2020-08-28 | End: 2021-01-08

## 2020-08-28 RX ORDER — SODIUM CHLORIDE 9 MG/ML
INJECTION, SOLUTION INTRAVENOUS CONTINUOUS PRN
Status: DISCONTINUED | OUTPATIENT
Start: 2020-08-28 | End: 2020-08-28 | Stop reason: SDUPTHER

## 2020-08-28 RX ADMIN — PROPOFOL 30 MG: 10 INJECTION, EMULSION INTRAVENOUS at 09:48

## 2020-08-28 RX ADMIN — SODIUM CHLORIDE: 9 INJECTION, SOLUTION INTRAVENOUS at 09:47

## 2020-08-28 NOTE — ANESTHESIA POSTPROCEDURE EVALUATION
Department of Anesthesiology  Postprocedure Note    Patient: Caryn Almendarez  MRN: [id number]  YOB: 1940  Date of evaluation: 8/28/2020  Time:  11:13 AM     Procedure Summary     Date:  08/28/20 Room / Location:  [facility] [place] CATH LAB    Anesthesia Start:  8576 Anesthesia Stop:  3635    Procedure:  CARDIOVERSION WITH ANESTHESIA Diagnosis:       Abnormal result of other cardiovascular function study      Paroxysmal atrial fibrillation      Syncope and collapse    Scheduled Providers:   Responsible Provider:  Carlos Salgado DO    Anesthesia Type:  MAC ASA Status:  3          Anesthesia Type: MAC    Skip Phase I:      Skip Phase II:      Last vitals: Reviewed and per EMR flowsheets.        Anesthesia Post Evaluation    Patient location during evaluation: bedside  Patient participation: complete - patient cannot participate  Level of consciousness: awake and alert  Airway patency: patent  Nausea & Vomiting: no nausea and no vomiting  Complications: no  Cardiovascular status: blood pressure returned to baseline  Respiratory status: acceptable  Hydration status: euvolemic

## 2020-08-28 NOTE — OP NOTE
Operative Note  [facility]- The Heart and [address] Electrophysiology  Procedure Report  PATIENT: Jaki [facility] RECORD NUMBER: [id number]  DATE OF PROCEDURE:  8/28/2020  ATTENDING ELECTROPHYSIOLOGIST:  Perico Lane MD      PROCECURE:    1. Direct Current Electrical Cardioversion    INDICATION:  New Onset Atrial Fibrillation    PROCEDURE PERFORMED By: Tino Chu MD    COMPICATIONS: None immediately apparent    ANESTHESIA: LMAC    DESCRIPTION OF PROCEDURE: The risks, benefits, and alternatives to the procedure were discussed with the patient, and informed consent was obtained. The patient was brought to the cardiovascular lab and sedated under the guidance of anesthesia. Once anesthesia was deemed adequate, a 200 J biphasic synchronous shock was applied. The patient was then allowed to wake in the usual fashion. Comment: The patient was therapeutically anticoagulated for a minimum of 3 consecutive weeks prior to cardioversion. SUMMARY:  1. Successful cardioversion of Atrial fibrillation to sinus rhythm. RECOMMENDATIONS:  1. Discharge to home when fully awake and alert, if clinically stable. 2. Resume all pre-procedure medications, including anticoagulation. 3. Follow-up in the office in 1 month. 4. Pacemaker checked pre and post cardioversion. Lower rate increased from 60 to 65 bpm  5.  Start metoprolol 25mg daily    Jake Mallory MD  Cardiac Electrophysiology  [facility]  The Heart and [facility] [place]: [place] [facility]  10:04 AM  8/28/2020

## 2020-08-28 NOTE — H&P
Cardiac Electrophysiology Consultation Note    Anne-Marie Sandoval  1940  Date of Service: 8/28/2020  PCP: Jaswinder Garrison MD      Reason for Admission:  New Onset Atrial Fibrillation    SUBJECTIVE: Anne-Marie Sandoval is an [de-identified] yo female who is here or management of Atrial fibrillation    6/18/20 : A recent remote transmission revealed that she has been in AF/Fl since 6/13/20. This is a new diagnosis for her. She was started on Eliquis and has been compliant    She has history of - cLBBB, EF 75%, s/p dual chamber PPM 10/12/17    She complains of fatigue related to her arrhythmia    Patient Active Problem List    Diagnosis Date Noted    Pacemaker      Overview Note:     A. Pacemaker generator is a [employer] Accolade MRI EL, model #: [id number], serial #: [id number]. DOI 10/12/17  B. The right atrial lead is a BSCI model #: [id number], serial #: [id number]. C. The RV pacemaker lead is a [employer] model #: [id number], serial #: [id number].       Syncope and collapse     AV block, 2nd degree 10/11/2017     Overview Note:     Mobitz II      Syncope, cardiogenic 10/11/2017    Microcytic anemia 10/11/2017    Radiculopathy 11/17/2016    Osteoporosis 05/16/2016    Malignant neoplasm of right female breast ([facility] [address] 05/16/2016    Vitamin D deficiency 05/16/2016       Past Medical History:   Diagnosis Date    Anemia     Cancer ([facility] [address] 2010    BREAST     GERD (gastroesophageal reflux disease)     Mobitz type 2 second degree atrioventricular block     Osteopenia     Syncope        Past Surgical History:   Procedure Laterality Date    BREAST LUMPECTOMY Right 2010    PACEMAKER PLACEMENT  10/12/2017    Dual chamber [employer]       Family History   Problem Relation Age of Onset    Asthma Mother     Coronary Art Dis Father        Social History     Tobacco Use    Smoking status: Never Smoker    Smokeless tobacco: Never Used   Substance Use Topics    Alcohol use: No       Current Outpatient Medications   Medication Sig Dispense Refill    on my exam  Skin: Skin is warm and dry. No bruising, no ecchymosis and no rash noted. Extremity: No visible clubbing or cyanosis. No edema. Psychiatric: Normal mood and affect. Thought content normal.     Pertinent Labs:  CBC:   Recent Labs     08/28/20  0836   WBC 5.8   HGB 12.2   HCT 39.9        BMP:  Recent Labs     08/28/20  0836      K 4.3      CO2 23   BUN 21   CREATININE 0.7   CALCIUM 9.4     \  TSH:   Lab Results   Component Value Date    TSH 1.100 10/11/2017        Remote Device Transmission--6/14/20: Normal device function. In AF/Fl since 6/11/20. Battery longevity: 10.5 years. Full report in Epic. Pertinent Cardiac Testing:  10/2017  TTE    Hyperdynamic left ventricular systolic function. LVEF is 75%.   No wall motion abnormalities. Septal bounce consistent with IVCD.   Grade 1 diastolic dysfunction. Elevated left atrial pressures.   Normal aortic root and ascending aorta.   No evidence for hemodynamically significant pericardial effusion.   Normal right ventricle structure and function based on repeat   measurements. Telemetry8/28/2020: AF rates 60-70        I have independently reviewed all of the ECGs and rhythm strips per above. I have personally reviewed the laboratory, cardiac diagnostic and radiographic testing as outlined above. I have reviewed previous records noted in 1940 [address]. Impression:    1. New Onset Atrial fibrillation  - new onsent-->6/11/20  - [id number]: 3  - Rate Control: None. - Rhythm Control: None.   - [address]: None at present  - BMI: 27.5 kg/m2  Plan for DCCV today    - Had an extensive discussion regarding all secondary causes of AF which include but are not limited to the following and then need for lifestyle modifications and stringent control of contributing medical conditions which include            - Weight Loss: aggressive weight loss and regular moderate cardiopulmonary exercise to maintain BMI <27 with a loss of at least 10% of their current weight which is safely and adequately maintained            - Exercise: 30min 3-4x/week of at least moderate cardiopulmonary exercise up to 250 min/wk            - Blood pressure: target of <130/80mmHg            - Dyslipidemia: add a statin if LDL >100mg/dL            - Diabetes Mellitus: add Metformin if HbA1c >6.5%            - Obstructive sleep apnea: evaluate for and or treat with CPAP if AHI >30/hour            - Abstinence from alcohol and tobacco products  - Discussed the potential improvement in all atrial fibrillation therapies if diet/exercise and weight loss are achieved per above. 2. Dual chamber PPM  -  BSCI Accolade MRI EL, model #: [id number], serial #: [id number]. DOI 10/12/17    3. H/O breast CA  - right-sided     4. HLD     5. GERD    Recommendations:    -Cardioversion today      I have spent a total of 70 minutes with the patient and his/her family reviewing the above stated recommendations. A total of >50% of that time involved face-to-face time providing counseling and or coordination of care with the other providers. Thank you for allowing me to participate in your patient's care. [facility] [place] Cardiac Electrophysiology  [address] [facility]    NOTE: This report was transcribed using voice recognition software. Every effort was made to ensure accuracy; however, inadvertent computerized transcription errors may be present.

## 2020-08-28 NOTE — ANESTHESIA PRE PROCEDURE
Department of Anesthesiology  Preprocedure Note       Name:  Dann Carl   Age:  [de-identified] y.o.  :  1940                                          MRN:  [id number]         Date:  2020      Surgeon: * Surgery not found *    Procedure: [facility] W/ANES    Medications prior to admission:   Prior to Admission medications    Medication Sig Start Date End Date Taking? Authorizing Provider   apixaban (ELIQUIS) 2.5 MG TABS tablet Take 1 tablet by mouth 2 times daily 20  Yes Bradley Argueta DO   vitamin D (CHOLECALCIFEROL) 1000 UNIT TABS tablet Take 1,000 Units by mouth daily   Yes Historical Provider, MD   calcium carbonate (OSCAL) 500 MG TABS tablet Take 500 mg by mouth three times daily   Yes Historical Provider, MD       Current medications:    Current Outpatient Medications   Medication Sig Dispense Refill    apixaban (ELIQUIS) 2.5 MG TABS tablet Take 1 tablet by mouth 2 times daily 60 tablet 11    vitamin D (CHOLECALCIFEROL) 1000 UNIT TABS tablet Take 1,000 Units by mouth daily      calcium carbonate (OSCAL) 500 MG TABS tablet Take 500 mg by mouth three times daily       No current facility-administered medications for this encounter.         Allergies:  No Known Allergies    Problem List:    Patient Active Problem List   Diagnosis Code    Osteoporosis M81.0    Malignant neoplasm of right female breast ([facility] [address] C50.911    Vitamin D deficiency E55.9    Radiculopathy M54.10    AV block, 2nd degree I44.1    Syncope, cardiogenic R55    Microcytic anemia D50.9    Syncope and collapse R55    Pacemaker Z95.0       Past Medical History:        Diagnosis Date    Anemia     Cancer ([facility] [address]     BREAST     GERD (gastroesophageal reflux disease)     Mobitz type 2 second degree atrioventricular block     Osteopenia     Syncope        Past Surgical History:        Procedure Laterality Date    BREAST LUMPECTOMY Right 2010    PACEMAKER PLACEMENT  10/12/2017    Dual chamber [address]       Social History: Social History     Tobacco Use    Smoking status: Never Smoker    Smokeless tobacco: Never Used   Substance Use Topics    Alcohol use: No                                Counseling given: Not Answered      Vital Signs (Current):   Vitals:    08/28/20 0830   BP: (!) 154/88   Pulse: 74   Temp: 36.6 °C (97.9 °F)   SpO2: 94%   Weight: 127 lb (57.6 kg)   Height: 4' 9\" (1.448 m)                                              BP Readings from Last 3 Encounters:   08/28/20 (!) 154/88   11/04/19 118/74   08/01/19 128/68       NPO Status:  >8 hrs                                                                               BMI:   Wt Readings from Last 3 Encounters:   08/28/20 127 lb (57.6 kg)   11/04/19 127 lb (57.6 kg)   08/01/19 127 lb (57.6 kg)     Body mass index is 27.48 kg/m². CBC:   Lab Results   Component Value Date    WBC 6.6 10/13/2017    RBC 5.11 10/13/2017    HGB 10.1 10/13/2017    HCT 32.4 10/13/2017    MCV 63.4 10/13/2017    RDW 15.2 10/13/2017     10/13/2017       CMP:   Lab Results   Component Value Date     10/13/2017    K 3.9 10/13/2017     10/13/2017    CO2 22 10/13/2017    BUN 12 10/13/2017    CREATININE 0.6 10/13/2017    GFRAA >60 10/13/2017    LABGLOM >60 10/13/2017    GLUCOSE 105 10/13/2017    PROT 6.2 10/11/2017    CALCIUM 8.4 10/13/2017    BILITOT 0.8 10/11/2017    ALKPHOS 65 10/11/2017    AST 15 10/11/2017    ALT 10 10/11/2017       POC Tests: No results for input(s): POCGLU, POCNA, POCK, POCCL, POCBUN, POCHEMO, POCHCT in the last 72 hours.     Coags:   Lab Results   Component Value Date    PROTIME 11.9 10/12/2017    INR 1.1 10/12/2017       HCG (If Applicable): No results found for: PREGTESTUR, PREGSERUM, HCG, HCGQUANT     ABGs: No results found for: PHART, PO2ART, LES4PKU, AGV6JIS, BEART, J0QHWTUP     Type & Screen (If Applicable):  No results found for: LABABO, LABRH    Drug/Infectious Status (If Applicable):  No results found for: HIV, HEPCAB    COVID-19 Screening (If

## 2020-08-31 LAB
EKG ATRIAL RATE: 288 BPM
EKG P AXIS: 79 DEGREES
EKG Q-T INTERVAL: 426 MS
EKG QRS DURATION: 138 MS
EKG QTC CALCULATION (BAZETT): 466 MS
EKG R AXIS: -3 DEGREES
EKG T AXIS: 143 DEGREES
EKG VENTRICULAR RATE: 72 BPM

## 2020-10-01 ENCOUNTER — TELEPHONE (OUTPATIENT)
Dept: CARDIOLOGY | Age: 80
End: 2020-10-01

## 2020-10-09 ENCOUNTER — TELEPHONE (OUTPATIENT)
Dept: CARDIOLOGY | Age: 80
End: 2020-10-09

## 2020-10-09 NOTE — TELEPHONE ENCOUNTER
Spoke with patient's sister Bea Beck and confirmed Lexiscan stress test and echocardiogram appointments on Oct. 13, 2020 starting at 0830. Instructions for test reviewed with patient's sister.

## 2020-10-13 ENCOUNTER — HOSPITAL ENCOUNTER (OUTPATIENT)
Dept: CARDIOLOGY | Age: 80
Discharge: HOME | End: 2020-10-13
Payer: MEDICARE

## 2020-10-13 VITALS
BODY MASS INDEX: 27.4 KG/M2 | RESPIRATION RATE: 20 BRPM | WEIGHT: 127 LBS | HEIGHT: 57 IN | DIASTOLIC BLOOD PRESSURE: 72 MMHG | TEMPERATURE: 97.8 F | SYSTOLIC BLOOD PRESSURE: 120 MMHG | HEART RATE: 72 BPM

## 2020-10-13 LAB
LV EF: 60 %
LVEF MODALITY: NORMAL

## 2020-10-13 PROCEDURE — A9500 TC99M SESTAMIBI: HCPCS | Performed by: INTERNAL MEDICINE

## 2020-10-13 PROCEDURE — 93017 CV STRESS TEST TRACING ONLY: CPT

## 2020-10-13 PROCEDURE — 93306 TTE W/DOPPLER COMPLETE: CPT | Performed by: PSYCHIATRY & NEUROLOGY

## 2020-10-13 PROCEDURE — 78452 HT MUSCLE IMAGE SPECT MULT: CPT

## 2020-10-13 PROCEDURE — 2580000003 HC RX 258: Performed by: INTERNAL MEDICINE

## 2020-10-13 PROCEDURE — 3430000000 HC RX DIAGNOSTIC RADIOPHARMACEUTICAL: Performed by: INTERNAL MEDICINE

## 2020-10-13 PROCEDURE — 6360000002 HC RX W HCPCS: Performed by: INTERNAL MEDICINE

## 2020-10-13 RX ORDER — SODIUM CHLORIDE 0.9 % (FLUSH) 0.9 %
10 SYRINGE (ML) INJECTION PRN
Status: DISCONTINUED | OUTPATIENT
Start: 2020-10-13 | End: 2020-10-14 | Stop reason: HOSPADM

## 2020-10-13 RX ADMIN — REGADENOSON 0.4 MG: 0.08 INJECTION, SOLUTION INTRAVENOUS at 13:07

## 2020-10-13 RX ADMIN — SODIUM CHLORIDE, PRESERVATIVE FREE 10 ML: 5 INJECTION INTRAVENOUS at 13:07

## 2020-10-13 RX ADMIN — Medication 28.2 MILLICURIE: at 13:07

## 2020-10-13 RX ADMIN — SODIUM CHLORIDE, PRESERVATIVE FREE 10 ML: 5 INJECTION INTRAVENOUS at 13:08

## 2020-10-13 RX ADMIN — SODIUM CHLORIDE, PRESERVATIVE FREE 10 ML: 5 INJECTION INTRAVENOUS at 09:32

## 2020-10-13 RX ADMIN — Medication 9 MILLICURIE: at 09:32

## 2020-10-13 NOTE — PROCEDURES
[address] [facility] [address] [phone]                Pharmacologic Stress Nuclear Gated SPECT Study    [facility] Number: [de-identified]    :  1940          Sex: female         Date of Study:  10/13/2020    Height: 4' 9\" (144.8 cm)         Weight: 127 lb (57.6 kg)     Ordering Provider: Chely Ponce MD          PCP: Talib De La Cruz MD      Cardiologist: Chely Ponce MD             Interpreting Physician: Toro Conrad MD  _________________________________________________________________________________    Indication:   Detecting the presence and location of coronary artery disease    Clinical History:   Patient has no known history of coronary artery disease. Resting ECG:    NV int - sec, QRS int .14 sec, QT int .42 sec; HR 63 bpm  Atrial fibrillation with controlled ventricular response, Left Bundle Branch Block and Occasional PVCs    Procedure:   Pharmacologic stress testing was performed with regadenoson 0.4 mg for 15 seconds. Additionally, low-level exercise was performed along with the infusion. The heart rate was 63 at baseline and nael to 84 beats during the infusion. This corresponds to 60% of maximum predicted heart rate. The blood pressure at baseline was 120/72 and blood pressure at the end of infusion was 118/70. Blood pressure response was normal during the stress procedure. The patient tolerated the infusion well. ECG during the infusion developed non specific changes. .    IMAGING: Myocardial perfusion imaging was performed at rest 30-35 minutes following the intravenous injection of 9.0 mCi of (Tc-Sestamibi) followed by 10 ml of Normal Saline. As per infusion protocol, the patient was injected intravenously with 28.2 mCi of (Tc-Sestamibi) followed by 10 ml of Normal Saline.   Gated post-stress tomographic imaging was performed 20-25 minutes after stress. FINDINGS: The overall quality of the study was good. Left ventricular cavity size was noted to be normal.    Rotational analog analysis demonstrated no patient motion or abnormal extracardiac radioactivity. A mild defect was present in the basal inferolateral wall(s) that was  small sized by quantification. The resting images reveal complete reversibility. Gated SPECT left ventricular ejection fraction was calculated to be 56%, with normal myocardial thickening and wall motion. Impression:    1. ECG during the infusion did not change. 2. The myocardial perfusion imaging was abnormal. The abnormality was a a small sized completely reversible defect in the basal inferolateral wall. 3. Overall left ventricular systolic function was normal without regional wall motion abnormalities. 4. Low risk general pharmacologic stress test.    Thank you for sending your patient to this [employer].      Electronically signed by Agata Camarena MD on 10/13/20 at 2:50 PM EDT

## 2020-10-14 ENCOUNTER — TELEPHONE (OUTPATIENT)
Dept: ADMINISTRATIVE | Age: 80
End: 2020-10-14

## 2020-10-14 ENCOUNTER — TELEPHONE (OUTPATIENT)
Dept: NON INVASIVE DIAGNOSTICS | Age: 80
End: 2020-10-14

## 2020-10-14 NOTE — TELEPHONE ENCOUNTER
----- Message from David Montez MD sent at 10/14/2020  9:35 AM EDT -----  Her stress test showed some small abnormality in the heart.  Pls refer to cardiology for evaluation  ----- Message -----  From: Tracy Tracy MD  Sent: 10/13/2020   2:52 PM EDT  To: David Montez MD

## 2020-10-14 NOTE — TELEPHONE ENCOUNTER
----- Message from Maria T Marie MD sent at 10/14/2020  9:32 AM EDT -----  Hear function is good.  Mild leakiness of valves  ----- Message -----  Daniel Foster Incoming Cardiology Results From [facility] [place]  Sent: 10/13/2020   6:03 PM EDT  To: Maria T Marie MD

## 2020-10-14 NOTE — TELEPHONE ENCOUNTER
Patient Appointment Form:      PCP: Dr. Raúl Pedro  Referring: Dr. Tracey Grajeda and Dr. Yissel Lovelace    Has the Patient:    Seen a Cardiologist? no    Had a heart catheterization? no    Had heart surgery? no    Had a stress test or nuclear stress test? yes   date: 10/13/20   facility name:  In Epic    Had an echocardiogram? yes   date: 10/13/20   facility name:  In [facility]    Had a vascular ultrasound? no    Had a 24/48 heart monitor or extended cardiac event monitor? 24hr   date: 8/14/17      Who ordered: In Epic    Had recent blood work in the last 6 months? yes    date: 8/28/20    ordering physician: In [address] [place] [address] Rd    Had a pacemaker/ICD/ILR implant? yes: pacemaker    device company: [employer]    Seen an Electrophysiologist? yes  date: 6/18/20   physician: Dr. Tracey Grajeda   location: [facility]    Had a cardiac ablation? no        Will send records via: in Epic      Date & time of appointment:  November 5, 2020 @ 11:00 a.m.

## 2020-11-02 NOTE — PROGRESS NOTES
[facility]  Rich Horn. Sanjay Craig M.D. Elli Julio Paulino M.D.  Abraham Ghosh. Jony Galan M.D. Ruthy Peralta M.D. Alvina Navarro M.D. MD Cari Ardon   1940  Alexa Ramirez MD      This 66-year-old woman had outpatient cardiac follow-up today. She had a pacemaker implant in 2017. This has been followed by [facility]. She reports that she has felt well and gone about her normal activities physically although they are apparently reduced commensurate with her age. She underwent a successful cardioversion for new onset atrial fibrillation 2020. As part of the evaluation Elizabeth SCHNEIDER requested both an echo and a Lexiscan stress perfusion study. The echo is unremarkable. The perfusion study shows a small mild reversible defect of the basal inferolateral segment. It is considered low risk    Medical History:  1 cancer  2 osteoporosis  3 radiculopathy  4 GERD  5 Holter monitor 24 hours completed 2017: Mobitz 2 AV block  6 hospital admission 10/10/2017 with syncope. Mobitz 2 AV block. Benedetta Ou LBBB  7 Echo 10/10/2017: EF 75% left atrium severely dilated. Normal mitral valve normal aortic valve. 8 permanent pacemaker implant 10/12/2017. DDD  9 remote transmission revealed   in AF/Fl since 20. [id number]= 3 Eliquis will be 2.5 mg BID  9. DCCV 2020: Sinus rhythm restored   10 TTE 10/13/2020: EF 60%. L atrium moderately dilated. Moderate MR.  RVSP 30. Mild AI.  11 Lexiscan stress MPS 10/13/2020: Small mild reversible defect basilar inferolateral wall. EF 56%. Low risk      She is a single. Lives at home. Family support. Family history: Mother and father . History not available.           Review of Systems:  Constitutional: negative for fever and chills  Respiratory: negative for cough and hemoptysis  Cardiovascular:   Gastrointestinal: negative for abdominal pain, diarrhea, nausea and vomiting  Genitourinary:negative for dysuria and hematuria  Derm: negative for rash and skin lesion(s)  Neurological: negative for seizures and tremors  Endocrine: negative for diabetic symptoms including polydipsia and polyuria  Musculoskeletal: negative for CTD  Psychiatric: negative for psychosis and major depression    On examination, she is an alert pleasant elderly woman in no distress skin is warm and dry. Respirations are unlabored. /72   Pulse 80   Resp 14   Ht 4' 9\" (1.448 m)   Wt 132 lb (59.9 kg)   BMI 28.56 kg/m² . HEENT negative for scleral icterus. Extraocular muscles intact. No facial asymmetry or central cyanosis. Neck without masses or goiter. No bruit or JVD. Cardiac apex not displaced. Rhythm regular. Abdomen normal.  Extremities without edema. Lungs are clear    EKG today per Dr. Love Ruggiero interpretation. All QRS complexes electronically paced at 75/min. Atrial mechanism probably fibrillation    Echo and stress perfusion results reviewed with the patient and family. The perfusion images are mildly abnormal but low risk and may represent an artifact given there functional/anatomic location. The patient is asymptomatic. It appears that atrial fibrillation has recurred.   Unless  significantly symptomatic, continuation of the current approach appears appropriate    At completion of today's visit, medications include the following:    Current Outpatient Medications:     metoprolol succinate (TOPROL XL) 25 MG extended release tablet, Take 1 tablet by mouth daily, Disp: 30 tablet, Rfl: 3    apixaban (ELIQUIS) 2.5 MG TABS tablet, Take 1 tablet by mouth 2 times daily, Disp: 60 tablet, Rfl: 11    vitamin D (CHOLECALCIFEROL) 1000 UNIT TABS tablet, Take 1,000 Units by mouth daily, Disp: , Rfl:     calcium carbonate (OSCAL) 500 MG TABS tablet, Take 500 mg by mouth three times daily, Disp: , Rfl:       Note: This report was completed utilizing computer voice recognition software. Every effort has been made to ensure accuracy, however; inadvertent computerized transcription errors may be present.     --Smiley Alarcon MD on 11/3/2020 at 6:14 PM

## 2020-11-03 ENCOUNTER — OFFICE VISIT (OUTPATIENT)
Dept: CARDIOLOGY CLINIC | Age: 80
End: 2020-11-03
Payer: MEDICARE

## 2020-11-03 VITALS
RESPIRATION RATE: 14 BRPM | SYSTOLIC BLOOD PRESSURE: 128 MMHG | BODY MASS INDEX: 28.48 KG/M2 | WEIGHT: 132 LBS | HEART RATE: 80 BPM | DIASTOLIC BLOOD PRESSURE: 72 MMHG | HEIGHT: 57 IN

## 2020-11-03 PROBLEM — R55 SYNCOPE AND COLLAPSE: Status: RESOLVED | Noted: 2020-11-03 | Resolved: 2020-11-03

## 2020-11-03 PROCEDURE — 99213 OFFICE O/P EST LOW 20 MIN: CPT | Performed by: INTERNAL MEDICINE

## 2020-11-03 PROCEDURE — 93000 ELECTROCARDIOGRAM COMPLETE: CPT | Performed by: INTERNAL MEDICINE

## 2020-11-11 ENCOUNTER — NURSE ONLY (OUTPATIENT)
Dept: NON INVASIVE DIAGNOSTICS | Age: 80
End: 2020-11-11
Payer: MEDICARE

## 2020-11-11 PROCEDURE — 93294 REM INTERROG EVL PM/LDLS PM: CPT | Performed by: INTERNAL MEDICINE

## 2020-11-11 PROCEDURE — 93296 REM INTERROG EVL PM/IDS: CPT | Performed by: INTERNAL MEDICINE

## 2020-11-25 NOTE — PROGRESS NOTES
See [employer] [place] report. Remote monitoring reviewed over a 90 day period. End of 90 day monitoring period date of service 11-.

## 2021-01-08 RX ORDER — METOPROLOL SUCCINATE 25 MG/1
TABLET, EXTENDED RELEASE ORAL
Qty: 30 TABLET | Refills: 11 | Status: SHIPPED
Start: 2021-01-08 | End: 2022-02-09

## 2021-02-10 ENCOUNTER — NURSE ONLY (OUTPATIENT)
Dept: NON INVASIVE DIAGNOSTICS | Age: 81
End: 2021-02-10
Payer: MEDICARE

## 2021-02-10 DIAGNOSIS — I44.1 AV BLOCK, 2ND DEGREE: Primary | ICD-10-CM

## 2021-02-10 DIAGNOSIS — Z95.0 PACEMAKER: ICD-10-CM

## 2021-02-10 PROCEDURE — 93294 REM INTERROG EVL PM/LDLS PM: CPT | Performed by: INTERNAL MEDICINE

## 2021-02-10 PROCEDURE — 93296 REM INTERROG EVL PM/IDS: CPT | Performed by: INTERNAL MEDICINE

## 2021-03-26 ENCOUNTER — OFFICE VISIT (OUTPATIENT)
Dept: NON INVASIVE DIAGNOSTICS | Age: 81
End: 2021-03-26
Payer: MEDICARE

## 2021-03-26 VITALS
RESPIRATION RATE: 20 BRPM | DIASTOLIC BLOOD PRESSURE: 78 MMHG | HEIGHT: 57 IN | BODY MASS INDEX: 28.18 KG/M2 | WEIGHT: 130.6 LBS | SYSTOLIC BLOOD PRESSURE: 136 MMHG | HEART RATE: 70 BPM

## 2021-03-26 DIAGNOSIS — I48.91 NEW ONSET ATRIAL FIBRILLATION (HCC): ICD-10-CM

## 2021-03-26 DIAGNOSIS — I44.1 AV BLOCK, 2ND DEGREE: Primary | ICD-10-CM

## 2021-03-26 DIAGNOSIS — I49.5 SSS (SICK SINUS SYNDROME) (HCC): ICD-10-CM

## 2021-03-26 DIAGNOSIS — Z95.0 PACEMAKER: Chronic | ICD-10-CM

## 2021-03-26 PROCEDURE — 99214 OFFICE O/P EST MOD 30 MIN: CPT | Performed by: INTERNAL MEDICINE

## 2021-03-26 PROCEDURE — 93280 PM DEVICE PROGR EVAL DUAL: CPT | Performed by: INTERNAL MEDICINE

## 2021-03-26 RX ORDER — ATORVASTATIN CALCIUM 10 MG/1
TABLET, FILM COATED ORAL
COMMUNITY
Start: 2021-03-08

## 2021-03-26 NOTE — PROGRESS NOTES
Robin Pruitt  1940  Date of Service: 3/26/2021      SUBJECTIVE: Robin Pruitt presents to the office today with the chief complaint: Mobitz type II AVB, pacemaker in situ. She has history of Mobitz type II AVB, pacemaker in situ. She denies any chest pain, sob, dizziness, syncope. Patient Active Problem List    Diagnosis Date Noted    New onset atrial fibrillation (HCC)     SSS (sick sinus syndrome) ([facility] [address]     Pacemaker      Overview Note:     A. Pacemaker generator is a [employer] MRI EL, model #: [id number], serial #: [id number]. DOI 10/12/17  B. The right atrial lead is a [employer] model #: [id number], serial #: [id number]. C. The RV pacemaker lead is a [employer] model #: [id number], serial #: [id number].  AV block, 2nd degree 10/11/2017     Overview Note:     Mobitz II      Syncope, cardiogenic 10/11/2017    Microcytic anemia 10/11/2017    Radiculopathy 11/17/2016    Osteoporosis 05/16/2016    Malignant neoplasm of right female breast ([facility] [address] 05/16/2016    Vitamin D deficiency 05/16/2016       Current Outpatient Medications   Medication Sig Dispense Refill    atorvastatin (LIPITOR) 10 MG tablet TAKE ONE TABLET BY MOUTH EVERY DAY      metoprolol succinate (TOPROL XL) 25 MG extended release tablet TAKE ONE TABLET BY MOUTH EVERY DAY 30 tablet 11    apixaban (ELIQUIS) 2.5 MG TABS tablet Take 1 tablet by mouth 2 times daily 60 tablet 11    vitamin D (CHOLECALCIFEROL) 1000 UNIT TABS tablet Take 1,000 Units by mouth daily      calcium carbonate (OSCAL) 500 MG TABS tablet Take 500 mg by mouth three times daily       No current facility-administered medications for this visit. No Known Allergies        ROS:   Constitutional: Negative for fever, activity change and appetite change. HENT: Negative for epistaxis, difficulty swallowing. Eyes: Negative for blurred vision or double vision. Respiratory: Negative for cough, chest tightness, shortness of breath and wheezing.    Cardiovascular: Negative for chest pain, palpitations and leg swelling. Gastrointestinal: Negative for abdominal pain, heartburn, or blood in stool. Genitourinary: Negative for hematuria, burning or frequency. Musculoskeletal: Negative for myalgias, stiffness, or swelling. Skin: Negative for rash, pain, or lumps. Neurological: Negative for syncope, seizures, or headaches. Psychiatric/Behavioral: Negative for confusion and agitation. The patient is not nervous/anxious. PHYSICAL EXAM:  Vitals:    03/26/21 1159   BP: 136/78   Pulse: 70   Resp: 20   Weight: 130 lb 9.6 oz (59.2 kg)   Height: 4' 9\" (1.448 m)     Constitutional: Oriented to person, place, and time. Well-developed and cooperative. Head: Normocephalic and atraumatic. Eyes: Conjunctivae are normal. Pupils are equal, round, and reactive to light. Neck: No hepatojugular reflux and no JVD present. Carotid bruit is not present. Cardiovascular: S1 normal, S2 normal and intact distal pulses. A regular rhythm present. PMI is not displaced. Pulmonary/Chest: Effort normal and breath sounds normal. No respiratory distress. Abdominal: Soft. Normal appearance and bowel sounds are normal.  No abdominal bruit and no pulsatile midline mass. There is no hepatomegaly. No tenderness. Musculoskeletal: Normal range of motion of all extremities, no muscle weakness. Neurological: Alert and oriented to person, place, and time. Gait normal.   Skin: Skin is warm and dry. No bruising, no ecchymosis and no rash noted. Extremity: No clubbing or cyanosis. No edema. Psychiatric: Normal mood and affect. Thought content normal.       10/12/2017 TTE     Hyperdynamic left ventricular systolic function. LVEF is 75%.   No wall motion abnormalities. Septal bounce consistent with IVCD.   Grade 1 diastolic dysfunction.  Elevated left atrial pressures.   Normal aortic root and ascending aorta.   No evidence for hemodynamically significant pericardial effusion.   Normal right ventricle structure

## 2021-05-12 ENCOUNTER — NURSE ONLY (OUTPATIENT)
Dept: NON INVASIVE DIAGNOSTICS | Age: 81
End: 2021-05-12
Payer: MEDICARE

## 2021-05-12 DIAGNOSIS — I49.5 SSS (SICK SINUS SYNDROME) (HCC): ICD-10-CM

## 2021-05-12 DIAGNOSIS — I44.1 AV BLOCK, 2ND DEGREE: Primary | ICD-10-CM

## 2021-05-12 DIAGNOSIS — Z95.0 PACEMAKER: ICD-10-CM

## 2021-05-31 PROCEDURE — 93296 REM INTERROG EVL PM/IDS: CPT | Performed by: INTERNAL MEDICINE

## 2021-05-31 PROCEDURE — 93294 REM INTERROG EVL PM/LDLS PM: CPT | Performed by: INTERNAL MEDICINE

## 2021-12-14 ENCOUNTER — OFFICE VISIT (OUTPATIENT)
Dept: CARDIOLOGY CLINIC | Age: 81
End: 2021-12-14
Payer: MEDICARE

## 2021-12-14 VITALS
WEIGHT: 131.6 LBS | HEART RATE: 73 BPM | HEIGHT: 57 IN | RESPIRATION RATE: 18 BRPM | BODY MASS INDEX: 28.39 KG/M2 | DIASTOLIC BLOOD PRESSURE: 80 MMHG | SYSTOLIC BLOOD PRESSURE: 120 MMHG

## 2021-12-14 DIAGNOSIS — I48.91 NEW ONSET ATRIAL FIBRILLATION (HCC): Primary | ICD-10-CM

## 2021-12-14 PROCEDURE — 93000 ELECTROCARDIOGRAM COMPLETE: CPT | Performed by: INTERNAL MEDICINE

## 2021-12-14 PROCEDURE — 99214 OFFICE O/P EST MOD 30 MIN: CPT | Performed by: INTERNAL MEDICINE

## 2021-12-14 NOTE — PATIENT INSTRUCTIONS
· Continue Toprol-XL 25 mg p.o. daily for rate control and dose adjusted Eliquis 2.5 mg p.o. twice daily for anticoagulation. · Continue atorvastatin 10 mg p.o. daily  · Follow-up with me in 1 year.

## 2021-12-14 NOTE — PROGRESS NOTES
OUTPATIENT CARDIOLOGY FOLLOW-UP    Name: Juventino Hutchins    Age: 80 y.o. Primary Care Physician: Tammy Peterson MD    Date of Service: 12/14/2021    Chief Complaint:   Chief Complaint   Patient presents with    Atrial Fibrillation     Yearly- Patient has no complaints. Interim History:   Ms. Nilda Frank is a 80-year-old  female with history of chronic left bundle branch block, syncope secondary to Mobitz type II AV block and left bundle branch block 10/10/2017 permanent pacemaker in situ implanted on 10/12/2017, paroxysmal atrial fibrillation diagnosed on 6/13/2020 with a [id number] of 3 on Eliquis for anticoagulation, s/p cardioversion to normal sinus rhythm on 8/28/2020, GERD, radiculopathy, osteoporosis, cancer, here for follow-up visit. She was seen in office 11/3/2020, since last visit, she has not had further hospitalizations or ER visits. She is compliant with medications, as well as salt and fluid intake. She does not take any over-the-counter arthritis medications. No new cardiac complaints since last cardiology evaluation. She denies recent chest pain, SOB, palpitations, lightheadedness, dizziness, syncope, PND, or orthopnea. SR on EKG.     Review of Systems:   Cardiac: As per HPI  General: No fever, chills  Pulmonary: As per HPI  HEENT: No visual disturbances, difficult swallowing  GI: No nausea, vomiting  Endocrine: No thyroid disease or DM  Musculoskeletal: MUÑOZ x 4, no focal motor deficits  Skin: Intact, no rashes  Neuro/Psych: No headache or seizures    Past Medical History:  Past Medical History:   Diagnosis Date    AF (atrial fibrillation) ([facility] [address] 08/2020    Anemia     Cancer ([facility] [address] 2010    BREAST     GERD (gastroesophageal reflux disease)     Mobitz type 2 second degree atrioventricular block     Osteopenia     Syncope        Past Surgical History:  Past Surgical History:   Procedure Laterality Date    BREAST LUMPECTOMY Right 2010    CARDIOVERSION  08/28/2020 Successful      DR. Dasilva    PACEMAKER PLACEMENT  10/12/2017    Dual chamber [employer]       Family History:  Family History   Problem Relation Age of Onset   [facility] Asthma Mother     Coronary Art Dis Father        Social History:  Social History     Socioeconomic History    Marital status: Single     Spouse name: Not on file    Number of children: Not on file    Years of education: Not on file    Highest education level: Not on file   Occupational History    Not on file   Tobacco Use    Smoking status: Never Smoker    Smokeless tobacco: Never Used   Vaping Use    Vaping Use: Never used   Substance and Sexual Activity    Alcohol use: No    Drug use: No    Sexual activity: Never   Other Topics Concern    Not on file   Social History Narrative    Not on file     Social Determinants of Health     Financial Resource Strain:     Difficulty of Paying Living Expenses: Not on file   Food Insecurity:     Worried About Running Out of Food in the Last Year: Not on file    Nori of Food in the Last Year: Not on file   Transportation Needs:     Lack of Transportation (Medical): Not on file    Lack of Transportation (Non-Medical):  Not on file   Physical Activity:     Days of Exercise per Week: Not on file    Minutes of Exercise per Session: Not on file   Stress:     Feeling of Stress : Not on file   Social Connections:     Frequency of Communication with Friends and Family: Not on file    Frequency of Social Gatherings with Friends and Family: Not on file    Attends Sabianism Services: Not on file    Active Member of Clubs or Organizations: Not on file    Attends Club or Organization Meetings: Not on file    Marital Status: Not on file   Intimate Partner Violence:     Fear of Current or Ex-Partner: Not on file    Emotionally Abused: Not on file    Physically Abused: Not on file    Sexually Abused: Not on file   Housing Stability:     Unable to Pay for Housing in the Last Year: Not on file    Number of Places Lived in the Last Year: Not on file    Unstable Housing in the Last Year: Not on file       Allergies:  No Known Allergies    Current Medications:  Current Outpatient Medications   Medication Sig Dispense Refill    apixaban (ELIQUIS) 2.5 MG TABS tablet Take 1 tablet by mouth 2 times daily 180 tablet 3    atorvastatin (LIPITOR) 10 MG tablet TAKE ONE TABLET BY MOUTH EVERY DAY      metoprolol succinate (TOPROL XL) 25 MG extended release tablet TAKE ONE TABLET BY MOUTH EVERY DAY 30 tablet 11    vitamin D (CHOLECALCIFEROL) 1000 UNIT TABS tablet Take 1,000 Units by mouth daily      calcium carbonate (OSCAL) 500 MG TABS tablet Take 500 mg by mouth three times daily       No current facility-administered medications for this visit. Physical Exam:  /80   Pulse 73   Resp 18   Ht 4' 9\" (1.448 m)   Wt 131 lb 9.6 oz (59.7 kg)   BMI 28.48 kg/m²   Wt Readings from Last 3 Encounters:   12/14/21 131 lb 9.6 oz (59.7 kg)   03/26/21 130 lb 9.6 oz (59.2 kg)   11/03/20 132 lb (59.9 kg)     Appearance: Awake, alert and oriented x 3, no acute respiratory distress  Skin: Intact, no rash  Head: Normocephalic, atraumatic  Eyes: EOMI, no conjunctival erythema  ENMT: No pharyngeal erythema, MMM, no rhinorrhea  Neck: Supple, no elevated JVP, no carotid bruits  Lungs: Clear to auscultation bilaterally. No wheezes, rales, or rhonchi.   Cardiac: Regular rate and rhythm, +S1S2, no murmurs apparent  Abdomen: Soft, nontender, +bowel sounds  Extremities: Moves all extremities x 4, trace lower extremity edema  Neurologic: No focal motor deficits apparent, normal mood and affect, alert and oriented x 3  Peripheral Pulses: Intact posterior tibial pulses bilaterally    Laboratory Tests:  Lab Results   Component Value Date    CREATININE 0.7 08/28/2020    BUN 21 08/28/2020     08/28/2020    K 4.3 08/28/2020     08/28/2020    CO2 23 08/28/2020     Lab Results   Component Value Date    MG 2.1 08/28/2020     Lab Results   Component Value Date    WBC 5.8 08/28/2020    HGB 12.2 08/28/2020    HCT 39.9 08/28/2020    MCV 64.6 (L) 08/28/2020     08/28/2020     Lab Results   Component Value Date    ALT 10 10/11/2017    AST 15 10/11/2017    ALKPHOS 65 10/11/2017    BILITOT 0.8 10/11/2017     Lab Results   Component Value Date    TROPONINI <0.01 10/10/2017     Lab Results   Component Value Date    INR 1.1 10/12/2017    PROTIME 11.9 10/12/2017     Lab Results   Component Value Date    TSH 1.100 10/11/2017     No results found for: LABA1C  No results found for: EAG  No results found for: CHOL  No results found for: TRIG  No results found for: HDL  No results found for: LDLCALC, LDLCHOLESTEROL  No results found for: LABVLDL, VLDL  No results found for: CHOLHDLRATIO    Cardiac Tests:  ECG: AV sequential paced rhythm with prolonged TX interval abnormal EKG. Echocardiogram10/13/2020:   Ejection fraction is visually estimated at 60%. Left ventricular size is grossly normal.   No regional wall motion abnormalities seen. Mild to moderate mitral regurgitation is present. Mild aortic regurgitation is noted. The aortic valve appears mildly sclerotic. Lexiscan nuclear stress test10/13/2020: Impression:    1. ECG during the infusion did not change. 2. The myocardial perfusion imaging was abnormal. The abnormality was a a small sized completely reversible defect in the basal inferolateral wall. 3. Overall left ventricular systolic function was normal without regional wall motion abnormalities. 4. Low risk general pharmacologic stress test.      Cardiac catheterization:       The ASCVD Risk score (Bakari Telles., et al., 2013) failed to calculate for the following reasons:     The 2013 ASCVD risk score is only valid for ages 36 to 78        ASSESSMENT:  · History of paroxysmal atrial fibrillation on Eliquis for anticoagulation, status post DCCV to normal sinus rhythm 8/28/2020  · History of syncope secondary to left bundle branch block and Mobitz type II block, status post DDD pacer in situ 10/12/2017  · [id number] score 3 on dose adjusted Eliquis 2.5 mg twice daily  · Chronic left bundle branch block  · GERD  · Radiculopathy, osteoporosis and history of cancer    Plan:   · Continue Toprol-XL 25 mg p.o. daily for rate control and dose adjusted Eliquis 2.5 mg p.o. twice daily for anticoagulation. · Continue atorvastatin 10 mg p.o. daily  · Follow-up with me in 1 year. The patient's current medication list, allergies, problem list and results of all previously ordered testing were reviewed at today's visit.   Nelson Garcia MD  [facility]) Cardiology

## 2022-02-09 RX ORDER — METOPROLOL SUCCINATE 25 MG/1
TABLET, EXTENDED RELEASE ORAL
Qty: 90 TABLET | Refills: 3 | Status: SHIPPED | OUTPATIENT
Start: 2022-02-09

## 2022-08-10 ENCOUNTER — TELEPHONE (OUTPATIENT)
Dept: NON INVASIVE DIAGNOSTICS | Age: 82
End: 2022-08-10

## 2022-08-10 NOTE — TELEPHONE ENCOUNTER
----- Message from Kimberly Brandt RN sent at 8/10/2022  3:30 PM EDT -----  Patient last seen by [facility] on 3/26/21. More AF. Can you make an appointment please?

## 2022-08-16 RX ORDER — APIXABAN 2.5 MG/1
TABLET, FILM COATED ORAL
Qty: 180 TABLET | Refills: 3 | Status: SHIPPED | OUTPATIENT
Start: 2022-08-16

## 2022-09-28 ENCOUNTER — OFFICE VISIT (OUTPATIENT)
Dept: NON INVASIVE DIAGNOSTICS | Age: 82
End: 2022-09-28
Payer: MEDICARE

## 2022-09-28 VITALS
HEART RATE: 79 BPM | RESPIRATION RATE: 16 BRPM | HEIGHT: 57 IN | BODY MASS INDEX: 27.61 KG/M2 | WEIGHT: 128 LBS | DIASTOLIC BLOOD PRESSURE: 78 MMHG | SYSTOLIC BLOOD PRESSURE: 128 MMHG

## 2022-09-28 DIAGNOSIS — Z79.01 ON APIXABAN THERAPY: ICD-10-CM

## 2022-09-28 DIAGNOSIS — Z95.0 PACEMAKER: ICD-10-CM

## 2022-09-28 DIAGNOSIS — I49.5 SSS (SICK SINUS SYNDROME) (HCC): ICD-10-CM

## 2022-09-28 DIAGNOSIS — I48.0 PAROXYSMAL ATRIAL FIBRILLATION (HCC): ICD-10-CM

## 2022-09-28 DIAGNOSIS — I44.1 AV BLOCK, 2ND DEGREE: Primary | ICD-10-CM

## 2022-09-28 PROCEDURE — 99213 OFFICE O/P EST LOW 20 MIN: CPT | Performed by: NURSE PRACTITIONER

## 2022-09-28 PROCEDURE — 1123F ACP DISCUSS/DSCN MKR DOCD: CPT | Performed by: NURSE PRACTITIONER

## 2022-09-28 PROCEDURE — 93000 ELECTROCARDIOGRAM COMPLETE: CPT | Performed by: INTERNAL MEDICINE

## 2022-09-28 NOTE — PROGRESS NOTES
[address] [address] [facility] Electrophysiology  Outpatient Progress Note  Allen Stewart  1940  Date of Service: 9/28/2022  PCP: Karli Hi MD  Cardiologist: Dr Justice Frias   Electrophysiologist:  Dr Karthik Sommer          Subjective: Allen Stewart is seen for follow-up and management of: Pacemaker and atrial fibrillation    Last seen in the office with Dr Karthik Sommer on 3/26/2021, had  cardioversion on 8/28/2020      PMH as noted below significant for Mobitz type II AVB, LBBB, Afib/AFL, pacemaker in situ. She had new onset atrial fibrillation discovered by pacemaker in June 2020, cardioverted on 8/28/2020. She has had intermittent short episodes since that time and continues on Eliquis for anticoagulation. She has no complaints today. She is able to do her daily activities without complaints. Denies any palpitations, shortness of breath or swelling. Patient Active Problem List   Diagnosis    Osteoporosis    Malignant neoplasm of right female breast ([facility] [address]    Vitamin D deficiency    Radiculopathy    AV block, 2nd degree    Syncope, cardiogenic    Microcytic anemia    Pacemaker    New onset atrial fibrillation (HCC)    SSS (sick sinus syndrome) (HCC)       Current Outpatient Medications   Medication Sig Dispense Refill    ELIQUIS 2.5 MG TABS tablet TAKE ONE TABLET BY MOUTH TWO TIMES A  tablet 3    metoprolol succinate (TOPROL XL) 25 MG extended release tablet TAKE ONE TABLET BY MOUTH EVERY DAY 90 tablet 3    atorvastatin (LIPITOR) 10 MG tablet TAKE ONE TABLET BY MOUTH EVERY DAY      vitamin D (CHOLECALCIFEROL) 1000 UNIT TABS tablet Take 1,000 Units by mouth daily      calcium carbonate (OSCAL) 500 MG TABS tablet Take 500 mg by mouth three times daily       No current facility-administered medications for this visit. No Known Allergies    ROS:   Constitutional: Negative for fever, activity change and appetite change.    HENT: Negative for epistaxis. Eyes: Negative for diploplia, blurred vision. Respiratory: Negative for cough, chest tightness, shortness of breath and wheezing. Cardiovascular: pertinent positives in HPI  Gastrointestinal: Negative for abdominal pain and blood in stool. All other review of systems are negative     PHYSICAL EXAM:      Vitals:    09/28/22 1000   BP: 128/78   Site: Left Upper Arm   Position: Sitting   Cuff Size: Medium Adult   Pulse: 79   Resp: 16   Weight: 128 lb (58.1 kg)   Height: 4' 9\" (1.448 m)     Constitutional: well-developed, no acute distress  Eyes: conjunctivae normal, no xanthelasma   Ears, Nose, Throat: oral mucosa moist, no cyanosis   CV: no JVD. Regular rate and rhythm. Normal S1S2 and no S3. No murmurs, rubs, or gallops. PMI is nondisplaced  Lungs: clear to auscultation bilaterally, normal respiratory effort without used of accessory muscles  Abdomen: soft, non-tender, bowel sounds present, no masses or hepatomegaly   Musculoskeletal: no digital clubbing, no edema   Skin: warm, no rashes     Data:    No results for input(s): WBC, HGB, HCT, PLT in the last 72 hours. No results for input(s): NA, K, CL, CO2, BUN, CREATININE, GLU, CALCIUM in the last 72 hours. Invalid input(s):  MAGNESIUM  Lab Results   Component Value Date/Time    MG 2.1 08/28/2020 08:36 AM     No results for input(s): TSH in the last 72 hours. No results for input(s): INR in the last 72 hours. EKG: AV pacing rate of 79 bpm   please see scan in Cardiology. Device Interrogation:   Underlying rhythm: Dependent, V paced at 40 bpm  Mode: DDDR 65-1 30  Battery Voltage/Longevity: 7.5 years  Pacing: A: 95% %  RV: 99%    P wave: 1.4 mV  Impedance: 706 ohms   Threshold: 0.7 V @0.5 ms  RV wave: Paced  mV  Impedance: 614 ohms   Threshold: 1.3 V @0.5 ms  Episodes: 18.1 hours total of A. fib since March 2021, A. fib burden less than 1%. Reprogramming included: Ventricular voltage increased to 2.6-0.5 for safety margin.    Overall device function is normal    All device programmable settings were evaluated per above and in the scanned document, along with iterative adjustments (capture thresholds) to assess and select the most appropriate final programming to provide for consistent delivery of the appropriate therapy and to verify function of the device. Assessment and plan:    1. Symptomatic Mobitz II AV block  - cLBBB, EF 75%  - s/p dual chamber PPM 10/12/17     2. Dual chamber PPM  -  BSCI Accolade MRI EL, model #: [id number], serial #: [id number]. DOI 10/12/17     3. Atrial fibrillation  - new onsent-->6/11/20; cardioverted in 8/2020   - [id number]: 3   - OAC: Eliquis will be 2.5 mg BID  -- DCCV 8/28/20., Lower rate increased from 60 to 65 bpm;  metoprolol 25mg daily  -Less than 1% burden on device check today, total of 18 hours since March 2021    4. H/O breast CA  - right-sided     5. HLD     6. GERD     Recommendations:      1. Remote transmission every 91 days  2.  1 year office visit, sooner if needs arise  3. No medication changes made today       Re-education on importance of well controlled HTN (goal BP < 130/80), adequate weight control (goal BMI of < 27), physical activity consisting of moderate cardiopulmonary exercise up to a goal of 250 min/wk, smoking/tobacco abstinence and limited ETOH intake. I have spent a total of 25 minutes with the patient and the family reviewing the above stated recommendations. And a total of >50% of that time involved face-to-face time providing counseling and or coordination of care with the other providers. Thank you for allowing me to participate in your patient's care. Please call me if there are any questions or concerns.       Laney Vallejo, GIANFRANCO - CNP  Cardiac Electrophysiology  [facility]) Physicians  [facility] [place]: [facility]  12:35 PM  9/28/2022

## 2023-02-03 ENCOUNTER — OFFICE VISIT (OUTPATIENT)
Dept: CARDIOLOGY CLINIC | Age: 83
End: 2023-02-03

## 2023-02-03 VITALS
HEART RATE: 74 BPM | RESPIRATION RATE: 14 BRPM | WEIGHT: 128.8 LBS | HEIGHT: 57 IN | DIASTOLIC BLOOD PRESSURE: 60 MMHG | BODY MASS INDEX: 27.79 KG/M2 | SYSTOLIC BLOOD PRESSURE: 108 MMHG

## 2023-02-03 DIAGNOSIS — I48.91 NEW ONSET ATRIAL FIBRILLATION (HCC): Primary | ICD-10-CM

## 2023-02-03 NOTE — PATIENT INSTRUCTIONS
Continue Toprol-XL 25 mg p.o. daily for rate control and dose adjusted Eliquis 2.5 mg p.o. twice daily for anticoagulation. Continue atorvastatin 10 mg p.o. daily  Will obtain latest lipid panel and chemistry panel results from Dr. Quinten Corral office. Follow-up with me in 1 year.

## 2023-02-03 NOTE — PROGRESS NOTES
OUTPATIENT CARDIOLOGY FOLLOW-UP    Name: Kvng Post    Age: 80 y.o. Primary Care Physician: Constantino Farrar MD    Date of Service: 2/3/2023    Chief Complaint:   Chief Complaint   Patient presents with    Atrial Fibrillation       Interim History:   Ms. Nalini Bond is a 41-year-old  female with history of chronic left bundle branch block, syncope secondary to Mobitz type II AV block and left bundle branch block 10/10/2017 permanent pacemaker in situ implanted on 10/12/2017, paroxysmal atrial fibrillation diagnosed on 6/13/2020 with a [id number] of 3 on Eliquis for anticoagulation, s/p cardioversion to normal sinus rhythm on 8/28/2020, GERD, radiculopathy, osteoporosis, cancer, here for follow-up visit. She was seen in office 12/14/2021, since last visit, she has not had further hospitalizations or ER visits. She is compliant with medications, as well as salt and fluid intake. She does not take any over-the-counter arthritis medications. No new cardiac complaints since last cardiology evaluation. She denies recent chest pain, SOB, palpitations, lightheadedness, dizziness, syncope, PND, or orthopnea. SR on EKG. Review of Systems:   Cardiac: As per HPI  General: No fever, chills  Pulmonary: As per HPI  HEENT: No visual disturbances, difficult swallowing  GI: No nausea, vomiting  Endocrine: No thyroid disease or DM  Musculoskeletal: MUÑOZ x 4, no focal motor deficits  Skin: Intact, no rashes  Neuro/Psych: No headache or seizures    Past Medical History:  Past Medical History:   Diagnosis Date    AF (atrial fibrillation) ([facility] [address] 08/2020    Anemia     Cancer ([facility] [address] 2010    BREAST     GERD (gastroesophageal reflux disease)     Mobitz type 2 second degree atrioventricular block     Osteopenia     Syncope        Past Surgical History:  Past Surgical History:   Procedure Laterality Date    BREAST LUMPECTOMY Right 2010    CARDIOVERSION  08/28/2020    Successful      DR. Dasilva    PACEMAKER PLACEMENT  10/12/2017    Dual chamber [employer]       Family History:  Family History   Problem Relation Age of Onset    Asthma Mother     Coronary Art Dis Father        Social History:  Social History     Socioeconomic History    Marital status: Single     Spouse name: Not on file    Number of children: Not on file    Years of education: Not on file    Highest education level: Not on file   Occupational History    Not on file   Tobacco Use    Smoking status: Never    Smokeless tobacco: Never   Vaping Use    Vaping Use: Never used   Substance and Sexual Activity    Alcohol use: No    Drug use: No    Sexual activity: Never   Other Topics Concern    Not on file   Social History Narrative    Not on file     Social Determinants of Health     Financial Resource Strain: Not on file   Food Insecurity: Not on file   Transportation Needs: Not on file   Physical Activity: Not on file   Stress: Not on file   Social Connections: Not on file   Intimate Partner Violence: Not on file   Housing Stability: Not on file       Allergies:  No Known Allergies    Current Medications:  Current Outpatient Medications   Medication Sig Dispense Refill    ELIQUIS 2.5 MG TABS tablet TAKE ONE TABLET BY MOUTH TWO TIMES A  tablet 3    metoprolol succinate (TOPROL XL) 25 MG extended release tablet TAKE ONE TABLET BY MOUTH EVERY DAY 90 tablet 3    atorvastatin (LIPITOR) 10 MG tablet TAKE ONE TABLET BY MOUTH EVERY DAY      vitamin D (CHOLECALCIFEROL) 1000 UNIT TABS tablet Take 1,000 Units by mouth daily      calcium carbonate (OSCAL) 500 MG TABS tablet Take 500 mg by mouth three times daily       No current facility-administered medications for this visit.        Physical Exam:  /60   Pulse 74   Resp 14   Ht 4' 9\" (1.448 m)   Wt 128 lb 12.8 oz (58.4 kg)   BMI 27.87 kg/m²   Wt Readings from Last 3 Encounters:   02/03/23 128 lb 12.8 oz (58.4 kg)   09/28/22 128 lb (58.1 kg)   12/14/21 131 lb 9.6 oz (59.7 kg)     Appearance: Awake, alert and oriented x 3, no acute respiratory distress  Skin: Intact, no rash  Head: Normocephalic, atraumatic  Eyes: EOMI, no conjunctival erythema  ENMT: No pharyngeal erythema, MMM, no rhinorrhea  Neck: Supple, no elevated JVP, no carotid bruits  Lungs: Clear to auscultation bilaterally. No wheezes, rales, or rhonchi. Cardiac: Regular rate and rhythm, +S1S2, no murmurs apparent  Abdomen: Soft, nontender, +bowel sounds  Extremities: Moves all extremities x 4, trace lower extremity edema  Neurologic: No focal motor deficits apparent, normal mood and affect, alert and oriented x 3  Peripheral Pulses: Intact posterior tibial pulses bilaterally    Laboratory Tests:  Lab Results   Component Value Date    CREATININE 0.7 08/28/2020    BUN 21 08/28/2020     08/28/2020    K 4.3 08/28/2020     08/28/2020    CO2 23 08/28/2020     Lab Results   Component Value Date/Time    MG 2.1 08/28/2020 08:36 AM     Lab Results   Component Value Date    WBC 5.8 08/28/2020    HGB 12.2 08/28/2020    HCT 39.9 08/28/2020    MCV 64.6 (L) 08/28/2020     08/28/2020     Lab Results   Component Value Date    ALT 10 10/11/2017    AST 15 10/11/2017    ALKPHOS 65 10/11/2017    BILITOT 0.8 10/11/2017     Lab Results   Component Value Date    TROPONINI <0.01 10/10/2017     Lab Results   Component Value Date    INR 1.1 10/12/2017    PROTIME 11.9 10/12/2017     Lab Results   Component Value Date    TSH 1.100 10/11/2017     No results found for: LABA1C  No results found for: EAG  No results found for: CHOL  No results found for: TRIG  No results found for: HDL  No results found for: LDLCALC, LDLCHOLESTEROL  No results found for: LABVLDL, VLDL  No results found for: CHOLHDLRATIO    Cardiac Tests:  ECG: AV sequential paced rhythm with prolonged TN interval abnormal EKG. Echocardiogram-10/13/2020:   Ejection fraction is visually estimated at 60%.    Left ventricular size is grossly normal.   No regional wall motion abnormalities seen.   Mild to moderate mitral regurgitation is present. Mild aortic regurgitation is noted. The aortic valve appears mildly sclerotic. Lexiscan nuclear stress test-10/13/2020: Impression:    ECG during the infusion did not change. The myocardial perfusion imaging was abnormal. The abnormality was a a small sized completely reversible defect in the basal inferolateral wall. Overall left ventricular systolic function was normal without regional wall motion abnormalities. Low risk general pharmacologic stress test.      Cardiac catheterization:       The ASCVD Risk score (Sosa DK, et al., 2019) failed to calculate for the following reasons: The 2019 ASCVD risk score is only valid for ages 36 to 78        ASSESSMENT:  History of paroxysmal atrial fibrillation on Eliquis for anticoagulation, status post DCCV to normal sinus rhythm 8/28/2020  History of syncope secondary to left bundle branch block and Mobitz type II block, status post DDD pacer in situ 10/12/2017  [id number] score 3 on dose adjusted Eliquis 2.5 mg twice daily  Chronic left bundle branch block  GERD  Radiculopathy, osteoporosis and history of cancer    Plan:   Continue Toprol-XL 25 mg p.o. daily for rate control and dose adjusted Eliquis 2.5 mg p.o. twice daily for anticoagulation. Continue atorvastatin 10 mg p.o. daily  Will obtain latest lipid panel and chemistry panel results from Dr. Chelita Maldonado office. Pacer evaluation results were reviewed as above, battery life 7.5 years, A-fib burden less than 1%, RV pacing 100%. Follow-up with me in 1 year. The patient's current medication list, allergies, problem list and results of all previously ordered testing were reviewed at today's visit.   Nori Whitfield MD  [facility]) Cardiology

## 2023-02-06 RX ORDER — METOPROLOL SUCCINATE 25 MG/1
TABLET, EXTENDED RELEASE ORAL
Qty: 90 TABLET | Refills: 3 | Status: SHIPPED | OUTPATIENT
Start: 2023-02-06

## 2023-05-16 ENCOUNTER — TELEPHONE (OUTPATIENT)
Dept: NON INVASIVE DIAGNOSTICS | Age: 83
End: 2023-05-16

## 2023-05-16 RX ORDER — METOPROLOL SUCCINATE 25 MG/1
25 TABLET, EXTENDED RELEASE ORAL 2 TIMES DAILY
Qty: 60 TABLET | Refills: 3 | Status: SHIPPED | OUTPATIENT
Start: 2023-05-16

## 2023-05-16 NOTE — TELEPHONE ENCOUNTER
Called and spoke with patient nice kavitha, her emergency contact, about most recent device transmission and increased rates of atrial fibrillation. Explained that Dr Tres Teague wants to increase Toprol XL to 25 mg twice daily.

## 2023-05-16 NOTE — TELEPHONE ENCOUNTER
----- Message from Gina Eli MD sent at 5/11/2023  8:23 AM EDT -----  Increase Toprol XL to 25 mg BID. Thanks.  ----- Message -----  From: Meliza Lorenz RN  Sent: 5/11/2023   7:40 AM EDT  To: Gina Eli MD    Please see Mur[facility] report 05/10/2023. Former [facility] patient. On Eliquis. DCCV in 2020.      AT/AFL [place]: 9% with 4 mode switches since 9/2022  Long & most recent episode in progress since Apr 19, 2023    Presenting rhythm: AFL

## 2023-07-25 ENCOUNTER — TELEPHONE (OUTPATIENT)
Dept: CARDIOLOGY CLINIC | Age: 83
End: 2023-07-25

## 2023-07-25 DIAGNOSIS — I48.91 NEW ONSET ATRIAL FIBRILLATION (HCC): Primary | ICD-10-CM

## 2023-07-25 DIAGNOSIS — R06.00 DYSPNEA, UNSPECIFIED TYPE: ICD-10-CM

## 2023-07-25 NOTE — TELEPHONE ENCOUNTER
Patient's Niece states Patient is fatigued and has leg edema. No other cardiac symptoms. Last seen February 2023. Please advise.

## 2023-07-27 ENCOUNTER — HOSPITAL ENCOUNTER (OUTPATIENT)
Age: 83
Discharge: HOME | End: 2023-07-27
Payer: MEDICARE

## 2023-07-27 ENCOUNTER — TELEPHONE (OUTPATIENT)
Dept: CARDIOLOGY CLINIC | Age: 83
End: 2023-07-27

## 2023-07-27 DIAGNOSIS — R06.02 SOB (SHORTNESS OF BREATH): Primary | ICD-10-CM

## 2023-07-27 DIAGNOSIS — I48.91 NEW ONSET ATRIAL FIBRILLATION (HCC): ICD-10-CM

## 2023-07-27 DIAGNOSIS — R06.00 DYSPNEA, UNSPECIFIED TYPE: ICD-10-CM

## 2023-07-27 LAB
ANION GAP SERPL CALCULATED.3IONS-SCNC: 12 MMOL/L (ref 7–16)
BASOPHILS # BLD: 0.05 K/UL (ref 0–0.2)
BASOPHILS NFR BLD: 1 % (ref 0–2)
BNP SERPL-MCNC: 1777 PG/ML (ref 0–450)
BUN SERPL-MCNC: 20 MG/DL (ref 6–23)
CALCIUM SERPL-MCNC: 9.1 MG/DL (ref 8.6–10.2)
CHLORIDE SERPL-SCNC: 107 MMOL/L (ref 98–107)
CO2 SERPL-SCNC: 23 MMOL/L (ref 22–29)
CREAT SERPL-MCNC: 0.6 MG/DL (ref 0.5–1)
EOSINOPHIL # BLD: 0.09 K/UL (ref 0.05–0.5)
EOSINOPHILS RELATIVE PERCENT: 1 % (ref 0–6)
ERYTHROCYTE [DISTWIDTH] IN BLOOD BY AUTOMATED COUNT: 15.1 % (ref 11.5–15)
GFR SERPL CREATININE-BSD FRML MDRD: >60 ML/MIN/1.73M2
GLUCOSE SERPL-MCNC: 105 MG/DL (ref 74–99)
HCT VFR BLD AUTO: 38.3 % (ref 34–48)
HGB BLD-MCNC: 11.7 G/DL (ref 11.5–15.5)
IMM GRANULOCYTES # BLD AUTO: <0.03 K/UL (ref 0–0.58)
IMM GRANULOCYTES NFR BLD: 0 % (ref 0–5)
LYMPHOCYTES NFR BLD: 1.33 K/UL (ref 1.5–4)
LYMPHOCYTES RELATIVE PERCENT: 18 % (ref 20–42)
MCH RBC QN AUTO: 19.7 PG (ref 26–35)
MCHC RBC AUTO-ENTMCNC: 30.5 G/DL (ref 32–34.5)
MCV RBC AUTO: 64.5 FL (ref 80–99.9)
MONOCYTES NFR BLD: 0.76 K/UL (ref 0.1–0.95)
MONOCYTES NFR BLD: 10 % (ref 2–12)
NEUTROPHILS NFR BLD: 70 % (ref 43–80)
NEUTS SEG NFR BLD: 5.24 K/UL (ref 1.8–7.3)
PLATELET # BLD AUTO: 260 K/UL (ref 130–450)
PMV BLD AUTO: 10.8 FL (ref 7–12)
POTASSIUM SERPL-SCNC: 4.2 MMOL/L (ref 3.5–5)
RBC # BLD AUTO: 5.94 M/UL (ref 3.5–5.5)
RBC # BLD: ABNORMAL 10*6/UL
SODIUM SERPL-SCNC: 142 MMOL/L (ref 132–146)
WBC OTHER # BLD: 7.5 K/UL (ref 4.5–11.5)

## 2023-07-27 PROCEDURE — 83880 ASSAY OF NATRIURETIC PEPTIDE: CPT

## 2023-07-27 PROCEDURE — 36415 COLL VENOUS BLD VENIPUNCTURE: CPT

## 2023-07-27 PROCEDURE — 80048 BASIC METABOLIC PNL TOTAL CA: CPT

## 2023-07-27 PROCEDURE — 85027 COMPLETE CBC AUTOMATED: CPT

## 2023-07-27 NOTE — TELEPHONE ENCOUNTER
Contacted patient's niece, Javed Galaviz, with results and recommendations per Dr. Power Every. She verbalized understanding. Orders for echo and labs in EPIC. Message forwarded to [facility] for scheduling.    ----- Message from Lizzy Graves MD sent at 7/27/2023  3:21 PM EDT -----  Labs were reviewed ,showed mild congestive heart failure. Please ask the patient to start on Lasix 20 mg p.o. daily and potassium 20 mg p.o. daily. Schedule her for an echocardiogram to assess function/CHF. Restrict daily salt intake to less than 2 g. Follow-up with me in the next few weeks.   Repeat another BMP and BNP in 1 week

## 2023-07-28 RX ORDER — FUROSEMIDE 20 MG/1
20 TABLET ORAL DAILY
Qty: 30 TABLET | Refills: 5 | Status: SHIPPED | OUTPATIENT
Start: 2023-07-28

## 2023-07-28 RX ORDER — POTASSIUM CHLORIDE 20 MEQ/1
20 TABLET, EXTENDED RELEASE ORAL DAILY
Qty: 30 TABLET | Refills: 5 | Status: SHIPPED | OUTPATIENT
Start: 2023-07-28

## 2023-08-02 ENCOUNTER — OFFICE VISIT (OUTPATIENT)
Dept: CARDIOLOGY CLINIC | Age: 83
End: 2023-08-02
Payer: MEDICARE

## 2023-08-02 VITALS
BODY MASS INDEX: 26.24 KG/M2 | DIASTOLIC BLOOD PRESSURE: 84 MMHG | WEIGHT: 125 LBS | SYSTOLIC BLOOD PRESSURE: 122 MMHG | HEIGHT: 58 IN | HEART RATE: 60 BPM | RESPIRATION RATE: 14 BRPM

## 2023-08-02 DIAGNOSIS — R29.6 FALLS FREQUENTLY: ICD-10-CM

## 2023-08-02 DIAGNOSIS — R26.9 NEUROLOGIC GAIT DYSFUNCTION: Primary | ICD-10-CM

## 2023-08-02 DIAGNOSIS — I48.91 NEW ONSET ATRIAL FIBRILLATION (HCC): Primary | ICD-10-CM

## 2023-08-02 DIAGNOSIS — R26.9 NEUROLOGIC GAIT DYSFUNCTION: ICD-10-CM

## 2023-08-02 PROCEDURE — 1123F ACP DISCUSS/DSCN MKR DOCD: CPT | Performed by: INTERNAL MEDICINE

## 2023-08-02 PROCEDURE — 93000 ELECTROCARDIOGRAM COMPLETE: CPT | Performed by: INTERNAL MEDICINE

## 2023-08-02 PROCEDURE — 99214 OFFICE O/P EST MOD 30 MIN: CPT | Performed by: INTERNAL MEDICINE

## 2023-08-02 RX ORDER — IBUPROFEN 200 MG
500 CAPSULE ORAL
COMMUNITY
Start: 2020-10-29 | End: 2023-08-03 | Stop reason: ALTCHOICE

## 2023-08-02 NOTE — PROGRESS NOTES
OUTPATIENT CARDIOLOGY FOLLOW-UP    Name: Braulio Angel    Age: 80 y.o. Primary Care Physician: Harpreet Brewster MD    Date of Service: 8/2/2023    Chief Complaint:   Chief Complaint   Patient presents with    1 Year Follow Up    Atrial Fibrillation       Interim History:   Ms. Clare England is a 80-year-old  female with history of chronic left bundle branch block, syncope secondary to Mobitz type II AV block and left bundle branch block 10/10/2017 permanent pacemaker in situ implanted on 10/12/2017, paroxysmal atrial fibrillation diagnosed on 6/13/2020 with a [id number] of 3 on Eliquis for anticoagulation, s/p cardioversion to normal sinus rhythm on 8/28/2020, GERD, radiculopathy, osteoporosis, cancer, here for follow-up visit. She was seen in office 2/3/2023, since last visit, she has not had further hospitalizations or ER visits. She is compliant with medications, as well as salt and fluid intake. She does not take any over-the-counter arthritis medications. No new cardiac complaints since last cardiology evaluation except for gait dysfunction and some falls without loss of consciousness. Patient's niece came with the patient walker home health aide. She denies recent chest pain, SOB, palpitations, lightheadedness, dizziness, syncope, PND, or orthopnea. SR on EKG.     Review of Systems:   Cardiac: As per HPI  General: No fever, chills  Pulmonary: As per HPI  HEENT: No visual disturbances, difficult swallowing  GI: No nausea, vomiting  Endocrine: No thyroid disease or DM  Musculoskeletal: MUÑOZ x 4, no focal motor deficits  Skin: Intact, no rashes  Neuro/Psych: No headache or seizures    Past Medical History:  Past Medical History:   Diagnosis Date    AF (atrial fibrillation) ([address]) 08/2020    Anemia     Cancer ([address]) 2010    BREAST     GERD (gastroesophageal reflux disease)     Mobitz type 2 second degree atrioventricular block     Osteopenia     Syncope        Past Surgical History:  Past

## 2023-08-02 NOTE — PATIENT INSTRUCTIONS
Continue Toprol-XL 25 mg p.o. daily for rate control and dose adjusted Eliquis 2.5 mg p.o. twice daily for anticoagulation. Continue atorvastatin 10 mg p.o. daily  Echo to assess LV function as scheduled. Continue furosemide 20 mg p.o. daily and potassium 20 mg p.o. daily for peripheral edema. Advised to contact Dr. Charlene Dang for arranging home health nurse and also for a prescription for a walker. Pacer evaluation results were reviewed as above, battery life 6 years, A-fib burden less than 1%, RV pacing 100%. Follow-up with me in 3 months.

## 2023-08-04 ENCOUNTER — HOSPITAL ENCOUNTER (OUTPATIENT)
Age: 83
Discharge: HOME | End: 2023-08-04
Payer: MEDICARE

## 2023-08-04 DIAGNOSIS — R06.02 SOB (SHORTNESS OF BREATH): ICD-10-CM

## 2023-08-04 LAB
ANION GAP SERPL CALCULATED.3IONS-SCNC: 11 MMOL/L (ref 7–16)
BNP SERPL-MCNC: 646 PG/ML (ref 0–450)
BUN SERPL-MCNC: 24 MG/DL (ref 6–23)
CALCIUM SERPL-MCNC: 9.5 MG/DL (ref 8.6–10.2)
CHLORIDE SERPL-SCNC: 107 MMOL/L (ref 98–107)
CO2 SERPL-SCNC: 23 MMOL/L (ref 22–29)
CREAT SERPL-MCNC: 0.6 MG/DL (ref 0.5–1)
GFR SERPL CREATININE-BSD FRML MDRD: >60 ML/MIN/1.73M2
GLUCOSE SERPL-MCNC: 125 MG/DL (ref 74–99)
POTASSIUM SERPL-SCNC: 4.3 MMOL/L (ref 3.5–5)
SODIUM SERPL-SCNC: 141 MMOL/L (ref 132–146)

## 2023-08-04 PROCEDURE — 83880 ASSAY OF NATRIURETIC PEPTIDE: CPT

## 2023-08-04 PROCEDURE — 80048 BASIC METABOLIC PNL TOTAL CA: CPT

## 2023-08-19 PROCEDURE — 93294 REM INTERROG EVL PM/LDLS PM: CPT | Performed by: STUDENT IN AN ORGANIZED HEALTH CARE EDUCATION/TRAINING PROGRAM

## 2023-08-19 PROCEDURE — 93296 REM INTERROG EVL PM/IDS: CPT | Performed by: STUDENT IN AN ORGANIZED HEALTH CARE EDUCATION/TRAINING PROGRAM

## 2023-08-23 ENCOUNTER — TELEPHONE (OUTPATIENT)
Dept: NON INVASIVE DIAGNOSTICS | Age: 83
End: 2023-08-23

## 2023-08-23 NOTE — TELEPHONE ENCOUNTER
Spoke with patient's niece Lilo and she verbalize understanding.   Patient is scheduled 9/20/23 at 10:30 am.

## 2023-08-23 NOTE — TELEPHONE ENCOUNTER
----- Message from Jose Benavidez DO sent at 8/19/2023 12:21 PM EDT -----  Regarding: AF  AF on remote. Please confirm OAC compliance > 3 weeks and schedule for DCCV.     Jose Benavidez DO

## 2023-08-24 DIAGNOSIS — I48.0 PAROXYSMAL ATRIAL FIBRILLATION (HCC): Primary | ICD-10-CM

## 2023-08-30 NOTE — TELEPHONE ENCOUNTER
Patient took last dose yesterday. Patient will get labs done.      Electronically signed by Shin Jones MA on 8/30/2023 at 11:40 AM

## 2023-09-05 ENCOUNTER — TELEPHONE (OUTPATIENT)
Dept: NEUROSURGERY | Age: 83
End: 2023-09-05

## 2023-09-05 ENCOUNTER — OFFICE VISIT (OUTPATIENT)
Dept: NEUROSURGERY | Age: 83
End: 2023-09-05
Payer: MEDICARE

## 2023-09-05 VITALS
DIASTOLIC BLOOD PRESSURE: 70 MMHG | OXYGEN SATURATION: 96 % | WEIGHT: 130 LBS | BODY MASS INDEX: 28.05 KG/M2 | HEIGHT: 57 IN | SYSTOLIC BLOOD PRESSURE: 150 MMHG | HEART RATE: 58 BPM

## 2023-09-05 DIAGNOSIS — S22.080A COMPRESSION FRACTURE OF T12 VERTEBRA, INITIAL ENCOUNTER (HCC): Primary | ICD-10-CM

## 2023-09-05 DIAGNOSIS — S32.010A CLOSED COMPRESSION FRACTURE OF BODY OF L1 VERTEBRA (HCC): ICD-10-CM

## 2023-09-05 PROCEDURE — 99203 OFFICE O/P NEW LOW 30 MIN: CPT | Performed by: PHYSICIAN ASSISTANT

## 2023-09-05 PROCEDURE — 99202 OFFICE O/P NEW SF 15 MIN: CPT

## 2023-09-05 PROCEDURE — 1123F ACP DISCUSS/DSCN MKR DOCD: CPT | Performed by: PHYSICIAN ASSISTANT

## 2023-09-05 ASSESSMENT — ENCOUNTER SYMPTOMS
BACK PAIN: 1
PHOTOPHOBIA: 0
TROUBLE SWALLOWING: 0
ABDOMINAL PAIN: 0
SHORTNESS OF BREATH: 0

## 2023-09-05 NOTE — PROGRESS NOTES
Effort: Pulmonary effort is normal.   Abdominal:      General: There is no distension. Musculoskeletal:      Cervical back: Normal range of motion and neck supple. Comments: Mild TTP lumbar spine   Skin:     General: Skin is warm and dry. Neurological:      Mental Status: She is alert. Comments: Alert and oriented x3  Moving all extremities well  Ambulates with a walker  Sensation intact to light touch     Psychiatric:         Thought Content: Thought content normal.       Assessment: This is an 80year old female presenting for low back pain after falling. CT demonstrates T12 and L1 compression deformity. Plan:      -CT scans reviewed and discussed in detail. Pain is progressively improving and patient does not wish to have any type of surgery. No need for further imaging since patient does not want surgery  -Custom fit (soft) TLSO ordered. A custom fit TLSO to provide external stabilization, reduce pain by restricting motion in the sagittal, coronal, and transverse planes of motion and facilitate healing of the fractured vertebrae. This will significantly reduce further risk of trauma to the spine and enable patient to be upright and participate in the rehabilitation process.   Ultimately the patient will be able to achieve an increased level of physical activity safely and effectively, leading to an overall improved quality of life.   -Wear TLSO when greater than 45 degrees  -OARRS report reviewed  -Return to neurosurgery clinic in 8 weeks with XR  -Call/return sooner if symptoms worsen or new issues arise in the interim         Olga Womack PA-C

## 2023-09-08 ENCOUNTER — HOSPITAL ENCOUNTER (OUTPATIENT)
Dept: CARDIOLOGY | Age: 83
Discharge: HOME | End: 2023-09-08
Payer: MEDICARE

## 2023-09-08 DIAGNOSIS — R06.02 SOB (SHORTNESS OF BREATH): ICD-10-CM

## 2023-09-08 PROCEDURE — 93306 TTE W/DOPPLER COMPLETE: CPT | Performed by: PSYCHIATRY & NEUROLOGY

## 2023-09-11 LAB
ANION GAP SERPL CALCULATED.3IONS-SCNC: 12 MMOL/L (ref 7–16)
BASOPHILS # BLD: 0.06 K/UL (ref 0–0.2)
BASOPHILS NFR BLD: 1 % (ref 0–2)
BUN SERPL-MCNC: 13 MG/DL (ref 6–23)
CALCIUM SERPL-MCNC: 9 MG/DL (ref 8.6–10.2)
CHLORIDE SERPL-SCNC: 103 MMOL/L (ref 98–107)
CO2 SERPL-SCNC: 24 MMOL/L (ref 22–29)
CREAT SERPL-MCNC: 0.6 MG/DL (ref 0.5–1)
EOSINOPHIL # BLD: 0.26 K/UL (ref 0.05–0.5)
EOSINOPHILS RELATIVE PERCENT: 4 % (ref 0–6)
ERYTHROCYTE [DISTWIDTH] IN BLOOD BY AUTOMATED COUNT: 17.9 % (ref 11.5–15)
GFR SERPL CREATININE-BSD FRML MDRD: >60 ML/MIN/1.73M2
GLUCOSE SERPL-MCNC: 88 MG/DL (ref 74–99)
HCT VFR BLD AUTO: 37.7 % (ref 34–48)
HGB BLD-MCNC: 11.4 G/DL (ref 11.5–15.5)
IMM GRANULOCYTES # BLD AUTO: <0.03 K/UL (ref 0–0.58)
IMM GRANULOCYTES NFR BLD: 0 % (ref 0–5)
LYMPHOCYTES NFR BLD: 1.85 K/UL (ref 1.5–4)
LYMPHOCYTES RELATIVE PERCENT: 28 % (ref 20–42)
MAGNESIUM SERPL-MCNC: 2 MG/DL (ref 1.6–2.6)
MCH RBC QN AUTO: 19.9 PG (ref 26–35)
MCHC RBC AUTO-ENTMCNC: 30.2 G/DL (ref 32–34.5)
MCV RBC AUTO: 65.7 FL (ref 80–99.9)
MONOCYTES NFR BLD: 0.77 K/UL (ref 0.1–0.95)
MONOCYTES NFR BLD: 12 % (ref 2–12)
NEUTROPHILS NFR BLD: 55 % (ref 43–80)
NEUTS SEG NFR BLD: 3.61 K/UL (ref 1.8–7.3)
PLATELET, FLUORESCENCE: 238 K/UL (ref 130–450)
PMV BLD AUTO: 11.2 FL (ref 7–12)
POTASSIUM SERPL-SCNC: 4.7 MMOL/L (ref 3.5–5)
RBC # BLD AUTO: 5.74 M/UL (ref 3.5–5.5)
SODIUM SERPL-SCNC: 139 MMOL/L (ref 132–146)
WBC OTHER # BLD: 6.6 K/UL (ref 4.5–11.5)

## 2023-09-19 ENCOUNTER — TELEPHONE (OUTPATIENT)
Dept: CARDIAC CATH/INVASIVE PROCEDURES | Age: 83
End: 2023-09-19

## 2023-09-20 ENCOUNTER — TELEPHONE (OUTPATIENT)
Dept: NON INVASIVE DIAGNOSTICS | Age: 83
End: 2023-09-20

## 2023-09-20 ENCOUNTER — ANESTHESIA EVENT (OUTPATIENT)
Dept: CARDIAC CATH/INVASIVE PROCEDURES | Age: 83
End: 2023-09-20
Payer: MEDICARE

## 2023-09-20 ENCOUNTER — HOSPITAL ENCOUNTER (OUTPATIENT)
Dept: CARDIAC CATH/INVASIVE PROCEDURES | Age: 83
Discharge: HOME | End: 2023-09-20
Payer: MEDICARE

## 2023-09-20 ENCOUNTER — ANESTHESIA (OUTPATIENT)
Dept: CARDIAC CATH/INVASIVE PROCEDURES | Age: 83
End: 2023-09-20
Payer: MEDICARE

## 2023-09-20 VITALS
RESPIRATION RATE: 19 BRPM | HEART RATE: 68 BPM | OXYGEN SATURATION: 99 % | TEMPERATURE: 97.6 F | BODY MASS INDEX: 30.08 KG/M2 | SYSTOLIC BLOOD PRESSURE: 153 MMHG | DIASTOLIC BLOOD PRESSURE: 63 MMHG | WEIGHT: 139 LBS

## 2023-09-20 LAB
EKG ATRIAL RATE: 39 BPM
EKG ATRIAL RATE: 62 BPM
EKG P-R INTERVAL: 312 MS
EKG Q-T INTERVAL: 472 MS
EKG Q-T INTERVAL: 488 MS
EKG QRS DURATION: 148 MS
EKG QRS DURATION: 154 MS
EKG QTC CALCULATION (BAZETT): 488 MS
EKG QTC CALCULATION (BAZETT): 509 MS
EKG R AXIS: -40 DEGREES
EKG R AXIS: -44 DEGREES
EKG T AXIS: 110 DEGREES
EKG T AXIS: 120 DEGREES
EKG VENTRICULAR RATE: 60 BPM
EKG VENTRICULAR RATE: 70 BPM

## 2023-09-20 PROCEDURE — 6360000002 HC RX W HCPCS: Performed by: NURSE ANESTHETIST, CERTIFIED REGISTERED

## 2023-09-20 PROCEDURE — 92960 CARDIOVERSION ELECTRIC EXT: CPT

## 2023-09-20 PROCEDURE — 6370000000 HC RX 637 (ALT 250 FOR IP): Performed by: STUDENT IN AN ORGANIZED HEALTH CARE EDUCATION/TRAINING PROGRAM

## 2023-09-20 PROCEDURE — 93010 ELECTROCARDIOGRAM REPORT: CPT | Performed by: INTERNAL MEDICINE

## 2023-09-20 PROCEDURE — 2580000003 HC RX 258: Performed by: NURSE ANESTHETIST, CERTIFIED REGISTERED

## 2023-09-20 PROCEDURE — 92960 CARDIOVERSION ELECTRIC EXT: CPT | Performed by: STUDENT IN AN ORGANIZED HEALTH CARE EDUCATION/TRAINING PROGRAM

## 2023-09-20 PROCEDURE — 93005 ELECTROCARDIOGRAM TRACING: CPT | Performed by: STUDENT IN AN ORGANIZED HEALTH CARE EDUCATION/TRAINING PROGRAM

## 2023-09-20 PROCEDURE — 3700000001 HC ADD 15 MINUTES (ANESTHESIA): Performed by: ANESTHESIOLOGY

## 2023-09-20 PROCEDURE — 2709999900 HC NON-CHARGEABLE SUPPLY

## 2023-09-20 PROCEDURE — 3700000000 HC ANESTHESIA ATTENDED CARE: Performed by: ANESTHESIOLOGY

## 2023-09-20 RX ORDER — PROPOFOL 10 MG/ML
INJECTION, EMULSION INTRAVENOUS PRN
Status: DISCONTINUED | OUTPATIENT
Start: 2023-09-20 | End: 2023-09-20 | Stop reason: SDUPTHER

## 2023-09-20 RX ORDER — SODIUM CHLORIDE 9 MG/ML
INJECTION, SOLUTION INTRAVENOUS CONTINUOUS PRN
Status: DISCONTINUED | OUTPATIENT
Start: 2023-09-20 | End: 2023-09-20 | Stop reason: SDUPTHER

## 2023-09-20 RX ORDER — CEPHALEXIN 250 MG/1
500 CAPSULE ORAL 2 TIMES DAILY
Qty: 20 CAPSULE | Refills: 0 | Status: SHIPPED | OUTPATIENT
Start: 2023-09-20 | End: 2023-09-25

## 2023-09-20 RX ADMIN — PROPOFOL 100 MG: 10 INJECTION, EMULSION INTRAVENOUS at 11:18

## 2023-09-20 RX ADMIN — SODIUM CHLORIDE: 9 INJECTION, SOLUTION INTRAVENOUS at 11:08

## 2023-09-20 RX ADMIN — APIXABAN 2.5 MG: 5 TABLET, FILM COATED ORAL at 10:36

## 2023-09-20 NOTE — ANESTHESIA POSTPROCEDURE EVALUATION
Department of Anesthesiology  Postprocedure Note    Patient: Richy Hennessy  MRN: [id number]  YOB: 1940  Date of evaluation: 9/20/2023      Procedure Summary     Date: 09/20/23 Room / Location: [facility] CATH LAB    Anesthesia Start: 1108 Anesthesia Stop: 1126    Procedure: CARDIOVERSION WITH ANESTHESIA Diagnosis: Shortness of breath    Scheduled Providers:  Responsible Provider: Carroll Mccoy MD    Anesthesia Type: MAC ASA Status: 3          Anesthesia Type: No value filed.     Skip Phase I:      Skip Phase II:        Anesthesia Post Evaluation    Patient location during evaluation: PACU  Patient participation: complete - patient participated  Level of consciousness: awake  Pain score: 0  Airway patency: patent  Nausea & Vomiting: no nausea  Complications: no  Cardiovascular status: hemodynamically stable  Respiratory status: acceptable  Hydration status: stable

## 2023-09-20 NOTE — TELEPHONE ENCOUNTER
----- Message from Mookie Curran [place] sent at 9/20/2023  2:10 PM EDT -----  Regarding: RE: janell padgett  I cancelled the appointment, she needs the remote check set up. Thanks.   ----- Message -----  From: Jayda Stuart DO  Sent: 9/20/2023   1:31 PM EDT  To: Mookie Curran MA;  Stephanie Shelby RN  Subject: dayron, remote                                     Please cancel appointment on 9/28/2023 9:40 AM.    Please schedule for a remote interrogation of pacemaker on 9/28/23.    -Jayda Stuart DO

## 2023-09-20 NOTE — TELEPHONE ENCOUNTER
Wireless remote scheduled for 928/2023. Reviewed latitude from 9/28/2023. Real time NSR. Will continue to monitor AF burden via latitude. 3 month office visit. Forwarding to scheduling.      [facility]

## 2023-09-20 NOTE — H&P
[facility] CARDIOLOGY  CARDIAC ELECTROPHYSIOLOGY DEPARTMENT/DIVISION OF CARDIOLOGY  H&P Report  PATIENT: Caryle Minors RECORD NUMBER: [id number]  DATE OF SERVICE:  9/20/2023  ATTENDING ELECTROPHYSIOLOGIST:  Lasha Saez DO   REFERRING PHYSICIAN: Lasha Saez DO and Felipe Coffey MD  CHIEF COMPLAINT: AF    HPI: Brigida Henao  is a 80 y.o. female with a history of nonvalvular persistent AF sp DCCV (8/28/20), AFL, 3* AV block sp BSCI dc PPM (DOI: 10/12/17- Dr Ingrid Encarnacion), SND, suspected ICM, right breast CA sp lumpectomy (2010). She is managed by Dr Shauna Nagy with apixaban 2.5 mg BID, furosemide 20 mg daily, and metoprolol XL 25 mg BID. In 2017, she was diagnosed with AV block and SND, which was treated with BSCI dc PPM by Dr Ingrid Encarnacion. In 2020, she was diagnosed with AF, which was treated with [facility] and DCCV (8/28/20). She presents today, 9/20/23; for outpatient DCCV. Her device is enrolled in remote monitoring, which most recently reported stable device function, RA pacing 32%, RV pacing 100%, and AFL ongoing since 4/19/23. She is compliant with OAC > 3 weeks. She denies any other complaints at this time. Prior cardiac testing:  - TTE (9/13/23): AF, LVEF = 60-65%, mild concentric LVH, severe LAE, mild MR, and mild AI.  - TTE (10/13/20): LVEF = 60%, moderate LAE, mild-moderate MR, mild TR, mild AI.  - Pharmacologic Nuclear Stress (10/13/20): LVEF = 56%, basal inferolateral wall ischemia (small area), and no infarct.  - TTE (10/11/17): LVEF = 75%, concentric LVH, stage I LVDD, severe LAE.  - 24 hour Holter (7/31/17): sinus, 38 - 105 bpm, SVE burden = 0%, VE burden = 2.1%, episodes of 2* type II AV block, and no AF, SVT, VT, or significant pauses.     Past Medical History:   Diagnosis Date    AF (atrial fibrillation) ([address]) 08/2020    Anemia     Cancer ([address]) 2010    BREAST     GERD (gastroesophageal reflux disease)     Mobitz type 2 second degree atrioventricular block     Osteopenia

## 2023-09-20 NOTE — DISCHARGE INSTRUCTIONS
Resume all medications. START cephalexin 250 mg every 12 hours for 5 days. Continue remote monitoring of pacemaker every 91 days. Follow-up with Dr Lula Charles office in ~3 months. Appointment on 9/28/2023 9:40 AM will be cancelled and changed to a remote interrogation of pacemaker.

## 2023-11-09 ENCOUNTER — OFFICE VISIT (OUTPATIENT)
Dept: CARDIOLOGY CLINIC | Age: 83
End: 2023-11-09
Payer: MEDICARE

## 2023-11-09 VITALS
DIASTOLIC BLOOD PRESSURE: 72 MMHG | HEIGHT: 57 IN | RESPIRATION RATE: 12 BRPM | BODY MASS INDEX: 29.56 KG/M2 | SYSTOLIC BLOOD PRESSURE: 132 MMHG | HEART RATE: 67 BPM | WEIGHT: 137 LBS

## 2023-11-09 DIAGNOSIS — I48.91 NEW ONSET ATRIAL FIBRILLATION (HCC): Primary | ICD-10-CM

## 2023-11-09 DIAGNOSIS — I50.33 ACUTE ON CHRONIC HEART FAILURE WITH PRESERVED EJECTION FRACTION (HCC): ICD-10-CM

## 2023-11-09 PROCEDURE — 93000 ELECTROCARDIOGRAM COMPLETE: CPT | Performed by: INTERNAL MEDICINE

## 2023-11-09 PROCEDURE — 1123F ACP DISCUSS/DSCN MKR DOCD: CPT | Performed by: INTERNAL MEDICINE

## 2023-11-09 PROCEDURE — 99214 OFFICE O/P EST MOD 30 MIN: CPT | Performed by: INTERNAL MEDICINE

## 2023-11-09 RX ORDER — TORSEMIDE 20 MG/1
20 TABLET ORAL DAILY
Qty: 90 TABLET | Refills: 3 | Status: SHIPPED | OUTPATIENT
Start: 2023-11-09

## 2023-11-09 NOTE — PROGRESS NOTES
OUTPATIENT CARDIOLOGY FOLLOW-UP    Name: Dori Lundberg    Age: 80 y.o. Primary Care Physician: Shanique Escalante MD    Date of Service: 11/9/2023    Chief Complaint:   Chief Complaint   Patient presents with    Atrial Fibrillation    Dizziness    Shortness of Breath       Interim History:   Ms. Eric Miranda is a 45-year-old  female with history of chronic left bundle branch block, syncope secondary to Mobitz type II AV block and left bundle branch block 10/10/2017 permanent pacemaker in situ implanted on 10/12/2017, paroxysmal atrial fibrillation diagnosed on 6/13/2020 with a [id number] of 3 on Eliquis for anticoagulation without any bleeding issues, s/p cardioversion to normal sinus rhythm on 8/28/2020, GERD, radiculopathy, osteoporosis, cancer, here for follow-up visit. She was seen in office 8/2/23, since last visit, she has not had further hospitalizations or ER visits. She is compliant with medications, as well as salt and fluid intake. She does not take any over-the-counter arthritis medications. No new cardiac complaints since last cardiology evaluation except for gait dysfunction and denies any more falls. She denies recent chest pain, SOB, palpitations, lightheadedness, dizziness, syncope, PND, or orthopnea. Paced rhythm on EKG.     Review of Systems:   Cardiac: As per HPI  General: No fever, chills  Pulmonary: As per HPI  HEENT: No visual disturbances, difficult swallowing  GI: No nausea, vomiting  Endocrine: No thyroid disease or DM  Musculoskeletal: MUÑOZ x 4, no focal motor deficits  Skin: Intact, no rashes  Neuro/Psych: No headache or seizures    Past Medical History:  Past Medical History:   Diagnosis Date    AF (atrial fibrillation) ([address]) 08/2020    Anemia     Cancer ([address]) 2010    BREAST     GERD (gastroesophageal reflux disease)     Mobitz type 2 second degree atrioventricular block     Osteopenia     Syncope        Past Surgical History:  Past Surgical History:   Procedure

## 2023-11-16 LAB
ANION GAP SERPL CALCULATED.3IONS-SCNC: 16 MMOL/L (ref 7–16)
BNP SERPL-MCNC: 456 PG/ML (ref 0–450)
BUN SERPL-MCNC: 24 MG/DL (ref 6–23)
CALCIUM SERPL-MCNC: 9.5 MG/DL (ref 8.6–10.2)
CHLORIDE SERPL-SCNC: 100 MMOL/L (ref 98–107)
CO2 SERPL-SCNC: 24 MMOL/L (ref 22–29)
CREAT SERPL-MCNC: 0.5 MG/DL (ref 0.5–1)
GFR SERPL CREATININE-BSD FRML MDRD: >60 ML/MIN/1.73M2
GLUCOSE SERPL-MCNC: 79 MG/DL (ref 74–99)
POTASSIUM SERPL-SCNC: 4.2 MMOL/L (ref 3.5–5)
SODIUM SERPL-SCNC: 140 MMOL/L (ref 132–146)

## 2023-11-17 ENCOUNTER — TELEPHONE (OUTPATIENT)
Dept: CARDIOLOGY CLINIC | Age: 83
End: 2023-11-17

## 2023-11-17 DIAGNOSIS — I50.33 ACUTE ON CHRONIC HEART FAILURE WITH PRESERVED EJECTION FRACTION (HCC): Primary | ICD-10-CM

## 2023-11-17 DIAGNOSIS — I48.91 NEW ONSET ATRIAL FIBRILLATION (HCC): ICD-10-CM

## 2023-11-17 DIAGNOSIS — I50.33 ACUTE ON CHRONIC HEART FAILURE WITH PRESERVED EJECTION FRACTION (HCC): ICD-10-CM

## 2023-11-17 NOTE — TELEPHONE ENCOUNTER
BMP and BNP in chart. Furosemide was changed to Torsemide 20 mg daily due to increased volume status and Potassium 20 mg daily for peripheral edema.

## 2023-11-17 NOTE — TELEPHONE ENCOUNTER
Labs are stable proBNP is improving. Continue current dose of Demadex and recheck another BMP in 10 days.

## 2023-11-17 NOTE — TELEPHONE ENCOUNTER
Refugio Jones at [facility] notified of lab results and Dr. Hidalgo's recommendation. Order placed for BMP.

## 2023-11-18 PROCEDURE — 93296 REM INTERROG EVL PM/IDS: CPT | Performed by: STUDENT IN AN ORGANIZED HEALTH CARE EDUCATION/TRAINING PROGRAM

## 2023-11-18 PROCEDURE — 93294 REM INTERROG EVL PM/LDLS PM: CPT | Performed by: STUDENT IN AN ORGANIZED HEALTH CARE EDUCATION/TRAINING PROGRAM

## 2023-11-27 ENCOUNTER — TELEPHONE (OUTPATIENT)
Dept: CARDIOLOGY CLINIC | Age: 83
End: 2023-11-27

## 2023-11-27 LAB
ANION GAP SERPL CALCULATED.3IONS-SCNC: 18 MMOL/L (ref 7–16)
BUN SERPL-MCNC: 33 MG/DL (ref 6–23)
CALCIUM SERPL-MCNC: 9.7 MG/DL (ref 8.6–10.2)
CHLORIDE SERPL-SCNC: 105 MMOL/L (ref 98–107)
CO2 SERPL-SCNC: 21 MMOL/L (ref 22–29)
CREAT SERPL-MCNC: 0.5 MG/DL (ref 0.5–1)
GFR SERPL CREATININE-BSD FRML MDRD: >60 ML/MIN/1.73M2
GLUCOSE SERPL-MCNC: 90 MG/DL (ref 74–99)
POTASSIUM SERPL-SCNC: 5 MMOL/L (ref 3.5–5)
SODIUM SERPL-SCNC: 144 MMOL/L (ref 132–146)

## 2023-11-27 NOTE — TELEPHONE ENCOUNTER
Continue torsemide 20 mg po daily and rpt BMP in one week, labs stable. Find out how she is doing in terms of edema.

## 2023-11-27 NOTE — TELEPHONE ENCOUNTER
Isaura at [facility], [facility] notified of Dr. Hidalgo's recommendations. Order placed for BMP. Satyaphyllis Mahmood states  patient was out of the facility since Thanksgiving, she just came back today. Prior to leaving with using the GLORIA stockings, patient's legs were looking good. Upon her return today legs are a little bit more swollen than prior to leaving the facility.

## 2023-11-27 NOTE — TELEPHONE ENCOUNTER
Repeat BMP done today but resulted to Dr. Mendoza Marcus. Furosemide was changed to Torsemide 20 mg daily due to increased volume status and Potassium 20 mg daily for peripheral edema on 11/9/23.

## 2023-11-28 NOTE — TELEPHONE ENCOUNTER
Rosa at [facility], [facility] notified of Dr. Hidalgo's assessment/recommendation.

## 2023-11-28 NOTE — TELEPHONE ENCOUNTER
Increased swelling is most likely related to increased dietary salt intake. Please ask them to monitor and call us back in 1 week if the swelling gets any worse.

## 2023-11-30 ENCOUNTER — OFFICE VISIT (OUTPATIENT)
Dept: NON INVASIVE DIAGNOSTICS | Age: 83
End: 2023-11-30

## 2023-11-30 VITALS
BODY MASS INDEX: 29.77 KG/M2 | WEIGHT: 138 LBS | HEIGHT: 57 IN | SYSTOLIC BLOOD PRESSURE: 120 MMHG | DIASTOLIC BLOOD PRESSURE: 74 MMHG | HEART RATE: 65 BPM | RESPIRATION RATE: 16 BRPM

## 2023-11-30 DIAGNOSIS — I48.19 PERSISTENT ATRIAL FIBRILLATION (HCC): Primary | ICD-10-CM

## 2023-11-30 DIAGNOSIS — Z95.0 PACEMAKER: Chronic | ICD-10-CM

## 2023-11-30 DIAGNOSIS — I44.1 AV BLOCK, 2ND DEGREE: ICD-10-CM

## 2023-11-30 DIAGNOSIS — Z79.01 CHRONIC ANTICOAGULATION: ICD-10-CM

## 2023-11-30 PROBLEM — E66.9 OBESITY WITH BODY MASS INDEX 30 OR GREATER: Status: ACTIVE | Noted: 2023-11-30

## 2023-11-30 PROBLEM — L03.90 CELLULITIS: Status: ACTIVE | Noted: 2023-11-30

## 2023-11-30 PROBLEM — S32.000A COMPRESSION FRACTURE OF LUMBAR VERTEBRA (HCC): Status: ACTIVE | Noted: 2023-11-30

## 2023-11-30 PROBLEM — R10.9 ABDOMINAL PAIN: Status: ACTIVE | Noted: 2023-11-30

## 2023-11-30 PROBLEM — S22.000A COMPRESSION FRACTURE OF THORACIC VERTEBRA (HCC): Status: ACTIVE | Noted: 2023-11-30

## 2023-11-30 PROBLEM — R94.39 ABNORMAL CARDIOVASCULAR STRESS TEST: Status: ACTIVE | Noted: 2023-11-30

## 2023-11-30 PROBLEM — K59.09 CHRONIC CONSTIPATION: Status: ACTIVE | Noted: 2023-11-30

## 2023-11-30 PROBLEM — R26.2 DIFFICULTY WALKING: Status: ACTIVE | Noted: 2023-11-30

## 2023-11-30 PROBLEM — E78.5 HYPERLIPIDEMIA: Status: ACTIVE | Noted: 2023-11-30

## 2023-11-30 PROBLEM — C80.1 PRIMARY MALIGNANT NEOPLASM (HCC): Status: ACTIVE | Noted: 2023-11-30

## 2023-11-30 RX ORDER — ACETAMINOPHEN 500 MG
500 TABLET ORAL EVERY 6 HOURS PRN
COMMUNITY

## 2023-11-30 NOTE — PROGRESS NOTES
[address] [facility] Electrophysiology  Outpatient Progress Note  Emigdio Hallman  1940  Date of Service: 11/30/2023  PCP: Estrella Ramos MD  Electrophysiologist: Dr. Meryle Switchback        Subjective: Emigdio Hallman is seen for follow-up and management of: pacemaker     Last seen in the office with MarmichaelGIANFRANCO Mace CNP   on 9/28/22   DCCV 9/20/23     PMH as noted below significant for  nonvalvular persistent AF sp DCCV (8/28/20), AFL, 3* AV block sp BSCI dc PPM (DOI: 10/12/17- Dr Jose Serna), SND, suspected ICM, right breast CA sp lumpectomy (2010). In 2017, she was diagnosed with AV block and SND, which was treated with BSCI dc PPM by Dr Jose Serna. In 2020, she was diagnosed with AF, which was treated with [facility] and DCCV (8/28/20). She had recurrent AFL ongoing since 4/19/23. DCCV 9/20/23. No recurrence since that time. She feels overall good. She lives in assisted living. Walks with walker. No complaints with activity. No bleeding issues on [facility]. Prior cardiac testing:  - TTE (9/13/23): AF, LVEF = 60-65%, mild concentric LVH, severe LAE, mild MR, and mild AI.  - TTE (10/13/20): LVEF = 60%, moderate LAE, mild-moderate MR, mild TR, mild AI.  - Pharmacologic Nuclear Stress (10/13/20): LVEF = 56%, basal inferolateral wall ischemia (small area), and no infarct.  - TTE (10/11/17): LVEF = 75%, concentric LVH, stage I LVDD, severe LAE.  - 24 hour Holter (7/31/17): sinus, 38 - 105 bpm, SVE burden = 0%, VE burden = 2.1%, episodes of 2* type II AV block, and no AF, SVT, VT, or significant pauses.       Patient Active Problem List   Diagnosis    Osteoporosis    Malignant neoplasm of right female breast ([facility])    Vitamin D deficiency    Radiculopathy    AV block, 2nd degree    Syncope, cardiogenic    Microcytic anemia    Pacemaker    New onset atrial fibrillation (HCC)    SSS (sick sinus syndrome) (HCC)    Abdominal pain    Abnormal cardiovascular stress test    Cellulitis    Chronic

## 2023-12-04 LAB
ANION GAP SERPL CALCULATED.3IONS-SCNC: 15 MMOL/L (ref 7–16)
BUN SERPL-MCNC: 24 MG/DL (ref 6–23)
CALCIUM SERPL-MCNC: 9.3 MG/DL (ref 8.6–10.2)
CHLORIDE SERPL-SCNC: 102 MMOL/L (ref 98–107)
CO2 SERPL-SCNC: 24 MMOL/L (ref 22–29)
CREAT SERPL-MCNC: 0.6 MG/DL (ref 0.5–1)
GFR SERPL CREATININE-BSD FRML MDRD: >60 ML/MIN/1.73M2
GLUCOSE SERPL-MCNC: 65 MG/DL (ref 74–99)
POTASSIUM SERPL-SCNC: 4.4 MMOL/L (ref 3.5–5)
SODIUM SERPL-SCNC: 141 MMOL/L (ref 132–146)

## 2023-12-05 ENCOUNTER — TELEPHONE (OUTPATIENT)
Dept: CARDIOLOGY CLINIC | Age: 83
End: 2023-12-05

## 2023-12-05 DIAGNOSIS — I50.33 ACUTE ON CHRONIC HEART FAILURE WITH PRESERVED EJECTION FRACTION (HCC): ICD-10-CM

## 2023-12-05 NOTE — TELEPHONE ENCOUNTER
----- Message from Jyoti Millan MD sent at 12/5/2023  2:31 PM EST -----  Her renal functions are normal.

## 2024-01-04 ENCOUNTER — TELEPHONE (OUTPATIENT)
Dept: NON INVASIVE DIAGNOSTICS | Age: 84
End: 2024-01-04

## 2024-01-04 NOTE — TELEPHONE ENCOUNTER
----- Message from Aaron Moses DO sent at 12/30/2023 11:39 PM EST -----  Regarding: RE: ongoing AF  Please contact patient to investigate if symptom change since return to AF.    If symptomatic, then recommend DCCV.    -Aaron Moses DO   ----- Message -----  From: Pippa Grewal RN  Sent: 12/28/2023   2:28 PM EST  To: Aaron Moses DO  Subject: ongoing AF                                       Murj report    Scheduled remote check to assess AF burden. Presenting: AflVp @ 60. Device programmed DDDR   Created By: Pippa Grewal 12/28/2023 02:26 PM    Atrial Flutter w/Controlled V Response  1  · Stored EGMs are consistent with or suggestive of Atrial Flutter with Controlled Ventricular Response  · AT/AF [place]: 72%  ·   Additional Notes:  Episode ongoing since 12/8/2023. Ventricular rates controlled at 60. Patient on Eliquis and Toprol XL    Patient saw Cindi on 11/30/23

## 2024-01-04 NOTE — TELEPHONE ENCOUNTER
I spoke with MYA Agosto at [facility]. Triny states Charlee has been asymptomatic. Charlee is able to ambulate without dyspnea and has not had any edema or weight gain. Charlee is able to do her routine activities without issue.    Pippa Grewal RN, BSN  [facility] Heart and Vascular [place]   Device Clinic

## 2024-03-08 ENCOUNTER — OFFICE VISIT (OUTPATIENT)
Dept: CARDIOLOGY CLINIC | Age: 84
End: 2024-03-08
Payer: COMMERCIAL

## 2024-03-08 VITALS
RESPIRATION RATE: 16 BRPM | WEIGHT: 140.9 LBS | SYSTOLIC BLOOD PRESSURE: 120 MMHG | BODY MASS INDEX: 30.4 KG/M2 | HEIGHT: 57 IN | DIASTOLIC BLOOD PRESSURE: 81 MMHG | HEART RATE: 65 BPM

## 2024-03-08 DIAGNOSIS — E78.5 HYPERLIPIDEMIA, UNSPECIFIED HYPERLIPIDEMIA TYPE: Primary | ICD-10-CM

## 2024-03-08 PROCEDURE — 99214 OFFICE O/P EST MOD 30 MIN: CPT | Performed by: INTERNAL MEDICINE

## 2024-03-08 PROCEDURE — G8484 FLU IMMUNIZE NO ADMIN: HCPCS | Performed by: INTERNAL MEDICINE

## 2024-03-08 PROCEDURE — 1036F TOBACCO NON-USER: CPT | Performed by: INTERNAL MEDICINE

## 2024-03-08 PROCEDURE — G8399 PT W/DXA RESULTS DOCUMENT: HCPCS | Performed by: INTERNAL MEDICINE

## 2024-03-08 PROCEDURE — G8419 CALC BMI OUT NRM PARAM NOF/U: HCPCS | Performed by: INTERNAL MEDICINE

## 2024-03-08 PROCEDURE — 1123F ACP DISCUSS/DSCN MKR DOCD: CPT | Performed by: INTERNAL MEDICINE

## 2024-03-08 PROCEDURE — G8427 DOCREV CUR MEDS BY ELIG CLIN: HCPCS | Performed by: INTERNAL MEDICINE

## 2024-03-08 PROCEDURE — 93000 ELECTROCARDIOGRAM COMPLETE: CPT | Performed by: INTERNAL MEDICINE

## 2024-03-08 PROCEDURE — 1090F PRES/ABSN URINE INCON ASSESS: CPT | Performed by: INTERNAL MEDICINE

## 2024-03-08 NOTE — PATIENT INSTRUCTIONS
Continue Toprol-XL 25 mg p.o. twice daily for rate control and dose adjusted Eliquis 2.5 mg p.o. twice daily for anticoagulation.  Continue atorvastatin 10 mg p.o. daily  Echo results reviewed, as above and discussed with the patient and with the patient, normal LV function with abnormal diastolic function.   Continue Torsemide 20 mg po daily and potassium 20 mg p.o. daily for peripheral edema.  Will add Jardiance 10 mg p.o. daily for HFpEF.  Pacer evaluation results were reviewed as above, battery life 6 years, A-fib burden less than 75%, RV pacing 99%.  Follow-up with me in 6 months.

## 2024-03-08 NOTE — PROGRESS NOTES
(gastroesophageal reflux disease)     Mobitz type 2 second degree atrioventricular block     Osteopenia     Syncope        Past Surgical History:  Past Surgical History:   Procedure Laterality Date    BREAST LUMPECTOMY Right 2010    CARDIOVERSION  08/28/2020    Successful      DR. Dasilva    CARDIOVERSION  09/20/2023    Successful  Dr. Moses    CARDIOVERSION  9/21/2023    PACEMAKER PLACEMENT  10/12/2017    Dual chamber [place] [employer]       Family History:  Family History   Problem Relation Age of Onset    Asthma Mother     Coronary Art Dis Father        Social History:  Social History     Socioeconomic History    Marital status: Single     Spouse name: Not on file    Number of children: Not on file    Years of education: Not on file    Highest education level: Not on file   Occupational History    Not on file   Tobacco Use    Smoking status: Never     Passive exposure: Never    Smokeless tobacco: Never   Vaping Use    Vaping Use: Never used   Substance and Sexual Activity    Alcohol use: No    Drug use: No    Sexual activity: Never   Other Topics Concern    Not on file   Social History Narrative    Not on file     Social Determinants of Health     Financial Resource Strain: Not on file   Food Insecurity: Not on file   Transportation Needs: Not on file   Physical Activity: Not on file   Stress: Not on file   Social Connections: Not on file   Intimate Partner Violence: Not on file   Housing Stability: Not on file       Allergies:  No Known Allergies    Current Medications:  Current Outpatient Medications   Medication Sig Dispense Refill    acetaminophen (TYLENOL) 500 MG tablet Take 1 tablet by mouth every 6 hours as needed for Pain      torsemide (DEMADEX) 20 MG tablet Take 1 tablet by mouth daily Will stop forsemide 90 tablet 3    apixaban (ELIQUIS) 2.5 MG TABS tablet Take 1 tablet by mouth 2 times daily 60 tablet 0    potassium chloride (KLOR-CON M) 20 MEQ extended release tablet Take 1 tablet by mouth daily

## 2024-04-19 ENCOUNTER — APPOINTMENT (OUTPATIENT)
Dept: CT IMAGING | Age: 84
End: 2024-04-19
Payer: COMMERCIAL

## 2024-04-19 ENCOUNTER — HOSPITAL ENCOUNTER (EMERGENCY)
Age: 84
Discharge: HOME | End: 2024-04-19
Attending: EMERGENCY MEDICINE
Payer: COMMERCIAL

## 2024-04-19 VITALS
BODY MASS INDEX: 31.71 KG/M2 | TEMPERATURE: 97.9 F | SYSTOLIC BLOOD PRESSURE: 148 MMHG | HEIGHT: 57 IN | WEIGHT: 147 LBS | DIASTOLIC BLOOD PRESSURE: 67 MMHG | OXYGEN SATURATION: 99 % | HEART RATE: 66 BPM | RESPIRATION RATE: 16 BRPM

## 2024-04-19 DIAGNOSIS — E04.1 THYROID NODULE: ICD-10-CM
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
## 2024-04-19 DIAGNOSIS — Z79.01 ANTICOAGULATED: ICD-10-CM

## 2024-04-19 DIAGNOSIS — S09.90XA CLOSED HEAD INJURY, INITIAL ENCOUNTER: Primary | ICD-10-CM

## 2024-04-19 PROCEDURE — 70486 CT MAXILLOFACIAL W/O DYE: CPT

## 2024-04-19 PROCEDURE — 70450 CT HEAD/BRAIN W/O DYE: CPT

## 2024-04-19 PROCEDURE — 72125 CT NECK SPINE W/O DYE: CPT

## 2024-04-19 PROCEDURE — 99284 EMERGENCY DEPT VISIT MOD MDM: CPT

## 2024-04-19 ASSESSMENT — ENCOUNTER SYMPTOMS
COUGH: 0
NAUSEA: 0
SHORTNESS OF BREATH: 0
ABDOMINAL PAIN: 0
DIFFICULTY BREATHING: 0
BLOOD IN STOOL: 0
DOUBLE VISION: 0
BACK PAIN: 0
BLURRED VISION: 0
VOMITING: 0

## 2024-04-19 ASSESSMENT — LIFESTYLE VARIABLES
HOW OFTEN DO YOU HAVE A DRINK CONTAINING ALCOHOL: NEVER
HOW MANY STANDARD DRINKS CONTAINING ALCOHOL DO YOU HAVE ON A TYPICAL DAY: PATIENT DOES NOT DRINK

## 2024-04-19 ASSESSMENT — PAIN - FUNCTIONAL ASSESSMENT: PAIN_FUNCTIONAL_ASSESSMENT: NONE - DENIES PAIN

## 2024-04-19 NOTE — ED PROVIDER NOTES
Roomed:  4/19/2024 11:56 AM  ED Bed Assignment:  [id number]    The nursing notes within the ED encounter and vital signs as below have been reviewed.   BP (!) 148/67   Pulse 66   Temp 97.9 °F (36.6 °C) (Oral)   Resp 16   Ht 1.448 m (4' 9.01\")   Wt 66.7 kg (147 lb)   SpO2 99%   BMI 31.80 kg/m²   Oxygen Saturation Interpretation: Normal      ------------------------------------------ PROGRESS NOTES ------------------------------------------  I have spoken with the patient and discussed today’s results, in addition to providing specific details for the plan of care and counseling regarding the diagnosis and prognosis.  Their questions are answered at this time and they are agreeable with the plan. I discussed at length with them reasons for immediate return here for re evaluation. They will followup with their primary care physician by calling their office on Monday.      --------------------------------- ADDITIONAL PROVIDER NOTES ---------------------------------  At this time the patient is without objective evidence of an acute process requiring hospitalization or inpatient management.  They have remained hemodynamically stable throughout their entire ED visit and are stable for discharge with outpatient follow-up.     The plan has been discussed in detail and they are aware of the specific conditions for emergent return, as well as the importance of follow-up.      Discharge Medication List as of 4/19/2024  2:48 PM          Diagnosis:  1. Closed head injury, initial encounter    2. Fall, initial encounter    3. Thyroid nodule    4. Anticoagulated        Disposition:  Patient's disposition: Discharge to home  Patient's condition is stable.       Manuel Daniels DO  04/19/24 1928

## 2024-04-19 NOTE — ED NOTES
Discharge instructions given to patient and Jessenia, verbalized understanding and patient was taken out to POV, via wheelchair by PCA, Jesseina to transport back to [facility] living

## 2024-05-15 ENCOUNTER — TELEPHONE (OUTPATIENT)
Dept: NON INVASIVE DIAGNOSTICS | Age: 84
End: 2024-05-15

## 2024-05-15 NOTE — TELEPHONE ENCOUNTER
----- Message from Aaron Moses DO sent at 5/14/2024 10:16 PM EDT -----  Regarding: Remote  Please check remote in 1 week to reassess AF.    If remains in AF, plan for DCCV after confirming YARIEL compliance >3 weeks..    -Aaron Moses DO

## 2024-05-15 NOTE — TELEPHONE ENCOUNTER
I spoke with patient's niece, Chaya. I informed her Charlee's remote pacemaker transmission is showing she is in AF. Per Dr. Moses, we will get another remote in one week (5/20/24). If she is still in AF and is taking her Eliquis consistently for three weeks, we will plan a cardioversion. Chaya states Charlee is now at [facility] in [place] so medications are given by the staff. I spoke with Jaylen, nurse, at [facility]. Jaylen states patient is taking the Eliquis bid. I told Jaylen about the remote in one week with possible DCCV. I told Jaylen we would notify them if she is still in A next week.      Pippa Grewal RN, BSN  [facility] Heart and Vascular [place]   Device Clinic

## 2024-05-17 ENCOUNTER — TELEPHONE (OUTPATIENT)
Dept: NON INVASIVE DIAGNOSTICS | Age: 84
End: 2024-05-17

## 2024-05-17 NOTE — TELEPHONE ENCOUNTER
Rosa from [facility] called,  pt has appointment on 5/22/24 with Dr. Moses needs rescheduled due to no .   Rescheduled for 9/5/24 with Lakisha

## 2024-05-23 ENCOUNTER — PREP FOR PROCEDURE (OUTPATIENT)
Dept: NON INVASIVE DIAGNOSTICS | Age: 84
End: 2024-05-23

## 2024-05-23 ENCOUNTER — TELEPHONE (OUTPATIENT)
Dept: NON INVASIVE DIAGNOSTICS | Age: 84
End: 2024-05-23

## 2024-05-23 DIAGNOSIS — I48.19 PERSISTENT ATRIAL FIBRILLATION (HCC): Primary | ICD-10-CM

## 2024-05-23 RX ORDER — SODIUM CHLORIDE 0.9 % (FLUSH) 0.9 %
5-40 SYRINGE (ML) INJECTION EVERY 12 HOURS SCHEDULED
Status: CANCELLED | OUTPATIENT
Start: 2024-05-23

## 2024-05-23 RX ORDER — SODIUM CHLORIDE 0.9 % (FLUSH) 0.9 %
5-40 SYRINGE (ML) INJECTION PRN
Status: CANCELLED | OUTPATIENT
Start: 2024-05-23

## 2024-05-23 RX ORDER — SODIUM CHLORIDE 9 MG/ML
INJECTION, SOLUTION INTRAVENOUS PRN
Status: CANCELLED | OUTPATIENT
Start: 2024-05-23

## 2024-05-23 NOTE — TELEPHONE ENCOUNTER
----- Message from Vicki Bond RN sent at 5/23/2024  9:53 AM EDT -----  Patient is still in AF. Per TB. Schedule cardioversion after this remote. Thank you

## 2024-05-23 NOTE — TELEPHONE ENCOUNTER
Spoke with patients contact Jessenia (niece) and she scheduled patient for 7/2/24 at 10:30 am (arrival time 9:30 am).  I spoke with nurse at patients assisted living and gave them instructions, I faxed a copy of instructions and lab orders.

## 2024-05-24 NOTE — TELEPHONE ENCOUNTER
1 week remote scheduled for 7.16.2024.    Vicki SARKARN,RN   [facility] Heart and Vascular [place]   Device Clinic

## 2024-06-10 LAB
ALBUMIN SERPL-MCNC: 4.1 G/DL (ref 3.5–5.2)
ALP SERPL-CCNC: 92 U/L (ref 35–104)
ALT SERPL-CCNC: 16 U/L (ref 0–32)
ANION GAP SERPL CALCULATED.3IONS-SCNC: 15 MMOL/L (ref 7–16)
AST SERPL-CCNC: 26 U/L (ref 0–31)
BASOPHILS # BLD: 0.08 K/UL (ref 0–0.2)
BASOPHILS NFR BLD: 1 % (ref 0–2)
BILIRUB SERPL-MCNC: 0.8 MG/DL (ref 0–1.2)
BUN SERPL-MCNC: 19 MG/DL (ref 6–23)
CALCIUM SERPL-MCNC: 9.4 MG/DL (ref 8.6–10.2)
CHLORIDE SERPL-SCNC: 103 MMOL/L (ref 98–107)
CO2 SERPL-SCNC: 23 MMOL/L (ref 22–29)
CREAT SERPL-MCNC: 0.6 MG/DL (ref 0.5–1)
EOSINOPHIL # BLD: 0.21 K/UL (ref 0.05–0.5)
EOSINOPHILS RELATIVE PERCENT: 4 % (ref 0–6)
ERYTHROCYTE [DISTWIDTH] IN BLOOD BY AUTOMATED COUNT: 18.6 % (ref 11.5–15)
GFR, ESTIMATED: 88 ML/MIN/1.73M2
GLUCOSE SERPL-MCNC: 67 MG/DL (ref 74–99)
HCT VFR BLD AUTO: 43.3 % (ref 34–48)
HGB BLD-MCNC: 12.8 G/DL (ref 11.5–15.5)
IMM GRANULOCYTES # BLD AUTO: <0.03 K/UL (ref 0–0.58)
IMM GRANULOCYTES NFR BLD: 0 % (ref 0–5)
LYMPHOCYTES NFR BLD: 1.98 K/UL (ref 1.5–4)
LYMPHOCYTES RELATIVE PERCENT: 35 % (ref 20–42)
MAGNESIUM SERPL-MCNC: 2.3 MG/DL (ref 1.6–2.6)
MCH RBC QN AUTO: 20.3 PG (ref 26–35)
MCHC RBC AUTO-ENTMCNC: 29.6 G/DL (ref 32–34.5)
MCV RBC AUTO: 68.5 FL (ref 80–99.9)
MONOCYTES NFR BLD: 0.52 K/UL (ref 0.1–0.95)
MONOCYTES NFR BLD: 9 % (ref 2–12)
NEUTROPHILS NFR BLD: 51 % (ref 43–80)
NEUTS SEG NFR BLD: 2.9 K/UL (ref 1.8–7.3)
PLATELET CONFIRMATION: NORMAL
PLATELET, FLUORESCENCE: 205 K/UL (ref 130–450)
PMV BLD AUTO: ABNORMAL FL (ref 7–12)
POTASSIUM SERPL-SCNC: 4.5 MMOL/L (ref 3.5–5)
PROT SERPL-MCNC: 7.2 G/DL (ref 6.4–8.3)
RBC # BLD AUTO: 6.32 M/UL (ref 3.5–5.5)
SODIUM SERPL-SCNC: 141 MMOL/L (ref 132–146)
WBC OTHER # BLD: 5.7 K/UL (ref 4.5–11.5)

## 2024-06-23 ENCOUNTER — HOSPITAL ENCOUNTER (INPATIENT)
Age: 84
LOS: 2 days | Discharge: HOME | DRG: 552 | End: 2024-06-25
Attending: EMERGENCY MEDICINE | Admitting: INTERNAL MEDICINE
Payer: COMMERCIAL

## 2024-06-23 ENCOUNTER — APPOINTMENT (OUTPATIENT)
Dept: GENERAL RADIOLOGY | Age: 84
DRG: 552 | End: 2024-06-23
Payer: COMMERCIAL

## 2024-06-23 ENCOUNTER — APPOINTMENT (OUTPATIENT)
Dept: CT IMAGING | Age: 84
DRG: 552 | End: 2024-06-23
Payer: COMMERCIAL
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
## 2024-06-23 DIAGNOSIS — S32.2XXA CLOSED FRACTURE OF SACRUM AND COCCYX, INITIAL ENCOUNTER (HCC): ICD-10-CM

## 2024-06-23 DIAGNOSIS — S32.9XXA: ICD-10-CM

## 2024-06-23 DIAGNOSIS — S32.10XA CLOSED FRACTURE OF SACRUM AND COCCYX, INITIAL ENCOUNTER (HCC): ICD-10-CM

## 2024-06-23 LAB
ALBUMIN SERPL-MCNC: 4.2 G/DL (ref 3.5–5.2)
ALP SERPL-CCNC: 96 U/L (ref 35–104)
ALT SERPL-CCNC: 20 U/L (ref 0–32)
ANION GAP SERPL CALCULATED.3IONS-SCNC: 10 MMOL/L (ref 7–16)
AST SERPL-CCNC: 24 U/L (ref 0–31)
BACTERIA URNS QL MICRO: ABNORMAL
BASOPHILS # BLD: 0.03 K/UL (ref 0–0.2)
BASOPHILS NFR BLD: 0 % (ref 0–2)
BILIRUB SERPL-MCNC: 0.7 MG/DL (ref 0–1.2)
BILIRUB UR QL STRIP: NEGATIVE
BUN SERPL-MCNC: 24 MG/DL (ref 6–23)
CALCIUM SERPL-MCNC: 9.2 MG/DL (ref 8.6–10.2)
CHLORIDE SERPL-SCNC: 105 MMOL/L (ref 98–107)
CLARITY UR: CLEAR
CO2 SERPL-SCNC: 26 MMOL/L (ref 22–29)
COLOR UR: YELLOW
CREAT SERPL-MCNC: 0.7 MG/DL (ref 0.5–1)
EOSINOPHIL # BLD: 0.04 K/UL (ref 0.05–0.5)
EOSINOPHILS RELATIVE PERCENT: 0 % (ref 0–6)
ERYTHROCYTE [DISTWIDTH] IN BLOOD BY AUTOMATED COUNT: 16.9 % (ref 11.5–15)
GFR, ESTIMATED: 86 ML/MIN/1.73M2
GLUCOSE SERPL-MCNC: 114 MG/DL (ref 74–99)
GLUCOSE UR STRIP-MCNC: >=1000 MG/DL
HCT VFR BLD AUTO: 40.5 % (ref 34–48)
HGB BLD-MCNC: 12.3 G/DL (ref 11.5–15.5)
HGB UR QL STRIP.AUTO: NEGATIVE
IMM GRANULOCYTES # BLD AUTO: 0.06 K/UL (ref 0–0.58)
IMM GRANULOCYTES NFR BLD: 1 % (ref 0–5)
KETONES UR STRIP-MCNC: NEGATIVE MG/DL
LEUKOCYTE ESTERASE UR QL STRIP: NEGATIVE
LYMPHOCYTES NFR BLD: 1.35 K/UL (ref 1.5–4)
LYMPHOCYTES RELATIVE PERCENT: 12 % (ref 20–42)
MCH RBC QN AUTO: 20.3 PG (ref 26–35)
MCHC RBC AUTO-ENTMCNC: 30.4 G/DL (ref 32–34.5)
MCV RBC AUTO: 66.9 FL (ref 80–99.9)
MONOCYTES NFR BLD: 0.8 K/UL (ref 0.1–0.95)
MONOCYTES NFR BLD: 7 % (ref 2–12)
NEUTROPHILS NFR BLD: 80 % (ref 43–80)
NEUTS SEG NFR BLD: 9.14 K/UL (ref 1.8–7.3)
NITRITE UR QL STRIP: POSITIVE
PH UR STRIP: 6 [PH] (ref 5–9)
PLATELET # BLD AUTO: 254 K/UL (ref 130–450)
PMV BLD AUTO: 10.6 FL (ref 7–12)
POTASSIUM SERPL-SCNC: 4.1 MMOL/L (ref 3.5–5)
PROT SERPL-MCNC: 6.9 G/DL (ref 6.4–8.3)
PROT UR STRIP-MCNC: NEGATIVE MG/DL
RBC # BLD AUTO: 6.05 M/UL (ref 3.5–5.5)
RBC #/AREA URNS HPF: ABNORMAL /HPF
SODIUM SERPL-SCNC: 141 MMOL/L (ref 132–146)
SP GR UR STRIP: 1.02 (ref 1–1.03)
UROBILINOGEN UR STRIP-ACNC: 0.2 EU/DL (ref 0–1)
WBC #/AREA URNS HPF: ABNORMAL /HPF
WBC OTHER # BLD: 11.4 K/UL (ref 4.5–11.5)

## 2024-06-23 PROCEDURE — 81001 URINALYSIS AUTO W/SCOPE: CPT

## 2024-06-23 PROCEDURE — 72125 CT NECK SPINE W/O DYE: CPT

## 2024-06-23 PROCEDURE — 85025 COMPLETE CBC W/AUTO DIFF WBC: CPT

## 2024-06-23 PROCEDURE — 87088 URINE BACTERIA CULTURE: CPT

## 2024-06-23 PROCEDURE — 6370000000 HC RX 637 (ALT 250 FOR IP)

## 2024-06-23 PROCEDURE — 73700 CT LOWER EXTREMITY W/O DYE: CPT

## 2024-06-23 PROCEDURE — 73030 X-RAY EXAM OF SHOULDER: CPT

## 2024-06-23 PROCEDURE — 80053 COMPREHEN METABOLIC PANEL: CPT

## 2024-06-23 PROCEDURE — 2060000000 HC ICU INTERMEDIATE R&B

## 2024-06-23 PROCEDURE — 87086 URINE CULTURE/COLONY COUNT: CPT

## 2024-06-23 PROCEDURE — 6360000002 HC RX W HCPCS: Performed by: INTERNAL MEDICINE

## 2024-06-23 PROCEDURE — 71045 X-RAY EXAM CHEST 1 VIEW: CPT

## 2024-06-23 PROCEDURE — 99285 EMERGENCY DEPT VISIT HI MDM: CPT

## 2024-06-23 PROCEDURE — 93005 ELECTROCARDIOGRAM TRACING: CPT

## 2024-06-23 PROCEDURE — 2500000003 HC RX 250 WO HCPCS: Performed by: INTERNAL MEDICINE

## 2024-06-23 PROCEDURE — 70450 CT HEAD/BRAIN W/O DYE: CPT

## 2024-06-23 PROCEDURE — 2580000003 HC RX 258: Performed by: INTERNAL MEDICINE

## 2024-06-23 PROCEDURE — 6370000000 HC RX 637 (ALT 250 FOR IP): Performed by: INTERNAL MEDICINE

## 2024-06-23 PROCEDURE — 96372 THER/PROPH/DIAG INJ SC/IM: CPT

## 2024-06-23 PROCEDURE — 6360000002 HC RX W HCPCS

## 2024-06-23 RX ORDER — TORSEMIDE 20 MG/1
20 TABLET ORAL DAILY
Status: DISCONTINUED | OUTPATIENT
Start: 2024-06-23 | End: 2024-06-25 | Stop reason: HOSPADM

## 2024-06-23 RX ORDER — ACETAMINOPHEN 650 MG/1
650 SUPPOSITORY RECTAL EVERY 6 HOURS PRN
Status: DISCONTINUED | OUTPATIENT
Start: 2024-06-23 | End: 2024-06-23

## 2024-06-23 RX ORDER — HYDROCODONE BITARTRATE AND ACETAMINOPHEN 5; 325 MG/1; MG/1
1 TABLET ORAL EVERY 6 HOURS PRN
Status: DISCONTINUED | OUTPATIENT
Start: 2024-06-23 | End: 2024-06-25 | Stop reason: HOSPADM

## 2024-06-23 RX ORDER — SODIUM CHLORIDE 0.9 % (FLUSH) 0.9 %
10 SYRINGE (ML) INJECTION EVERY 12 HOURS SCHEDULED
Status: DISCONTINUED | OUTPATIENT
Start: 2024-06-23 | End: 2024-06-25 | Stop reason: HOSPADM

## 2024-06-23 RX ORDER — SODIUM CHLORIDE 0.9 % (FLUSH) 0.9 %
10 SYRINGE (ML) INJECTION PRN
Status: DISCONTINUED | OUTPATIENT
Start: 2024-06-23 | End: 2024-06-25 | Stop reason: HOSPADM

## 2024-06-23 RX ORDER — SODIUM CHLORIDE 9 MG/ML
INJECTION, SOLUTION INTRAVENOUS PRN
Status: DISCONTINUED | OUTPATIENT
Start: 2024-06-23 | End: 2024-06-25 | Stop reason: HOSPADM

## 2024-06-23 RX ORDER — ACETAMINOPHEN 325 MG/1
650 TABLET ORAL EVERY 6 HOURS PRN
Status: DISCONTINUED | OUTPATIENT
Start: 2024-06-23 | End: 2024-06-23

## 2024-06-23 RX ORDER — ENOXAPARIN SODIUM 100 MG/ML
40 INJECTION SUBCUTANEOUS DAILY
Status: DISCONTINUED | OUTPATIENT
Start: 2024-06-23 | End: 2024-06-23

## 2024-06-23 RX ORDER — ACETAMINOPHEN 500 MG
500 TABLET ORAL EVERY 6 HOURS PRN
Status: DISCONTINUED | OUTPATIENT
Start: 2024-06-23 | End: 2024-06-25 | Stop reason: HOSPADM

## 2024-06-23 RX ORDER — HYDROCODONE BITARTRATE AND ACETAMINOPHEN 5; 325 MG/1; MG/1
1 TABLET ORAL
Status: COMPLETED | OUTPATIENT
Start: 2024-06-23 | End: 2024-06-23

## 2024-06-23 RX ORDER — ATORVASTATIN CALCIUM 10 MG/1
10 TABLET, FILM COATED ORAL NIGHTLY
Status: DISCONTINUED | OUTPATIENT
Start: 2024-06-23 | End: 2024-06-25 | Stop reason: HOSPADM

## 2024-06-23 RX ORDER — METOPROLOL SUCCINATE 25 MG/1
25 TABLET, EXTENDED RELEASE ORAL DAILY
Status: DISCONTINUED | OUTPATIENT
Start: 2024-06-23 | End: 2024-06-25 | Stop reason: HOSPADM

## 2024-06-23 RX ORDER — ORPHENADRINE CITRATE 30 MG/ML
60 INJECTION INTRAMUSCULAR; INTRAVENOUS ONCE
Status: COMPLETED | OUTPATIENT
Start: 2024-06-23 | End: 2024-06-23

## 2024-06-23 RX ADMIN — ORPHENADRINE CITRATE 60 MG: 30 INJECTION, SOLUTION INTRAMUSCULAR; INTRAVENOUS at 10:18

## 2024-06-23 RX ADMIN — APIXABAN 2.5 MG: 2.5 TABLET, FILM COATED ORAL at 21:19

## 2024-06-23 RX ADMIN — SODIUM CHLORIDE, PRESERVATIVE FREE 10 ML: 5 INJECTION INTRAVENOUS at 21:20

## 2024-06-23 RX ADMIN — MICONAZOLE NITRATE: 20 POWDER TOPICAL at 21:18

## 2024-06-23 RX ADMIN — ATORVASTATIN CALCIUM 10 MG: 10 TABLET, FILM COATED ORAL at 21:19

## 2024-06-23 RX ADMIN — WATER 1000 MG: 1 INJECTION INTRAMUSCULAR; INTRAVENOUS; SUBCUTANEOUS at 16:20

## 2024-06-23 RX ADMIN — HYDROCODONE BITARTRATE AND ACETAMINOPHEN 1 TABLET: 5; 325 TABLET ORAL at 10:18

## 2024-06-23 ASSESSMENT — PAIN SCALES - GENERAL
PAINLEVEL_OUTOF10: 0
PAINLEVEL_OUTOF10: 7
PAINLEVEL_OUTOF10: 7

## 2024-06-23 NOTE — PROGRESS NOTES
4 Eyes Skin Assessment     NAME:  Charlee Clay  YOB: 1940  MEDICAL RECORD NUMBER:  [id number]    The patient is being assessed for  Admission    I agree that at least one RN has performed a thorough Head to Toe Skin Assessment on the patient. ALL assessment sites listed below have been assessed.      Areas assessed by both nurses:    Head, Face, Ears, Shoulders, Back, Chest, Arms, Elbows, Hands, Sacrum. Buttock, Coccyx, Ischium, and Legs. Feet and Heels        Does the Patient have a Wound? No noted wound(s)       Yadiel Prevention initiated by RN: No  Wound Care Orders initiated by RN: No    Pressure Injury (Stage 3,4, Unstageable, DTI, NWPT, and Complex wounds) if present, place Wound referral order by RN under : No    New Ostomies, if present place, Ostomy referral order under : No     Nurse 1 eSignature: Electronically signed by Pascale Arreguin RN on 6/23/24 at 3:52 PM EDT    **SHARE this note so that the co-signing nurse can place an eSignature**    Nurse 2 eSignature: {Esignature:[id number]}

## 2024-06-23 NOTE — ED PROVIDER NOTES
YUE 7S INTERMDIATE MED SURG  EMERGENCY DEPARTMENT ENCOUNTER      Pt Name: Charlee Clay  MRN: [id number]  Birthdate 1940  Date of evaluation: 6/23/2024  Provider: Renzo Leon DO  PCP: Huber Ardon MD    HPI: 84-year-old female present emerged part complaints of mechanical fall.  Patient reports that she was in the elevator around 8 AM when walker was tripped on by worker with walker going left and patient falling to the right.  Patient reports landed on the right shoulder right hip.  Denies any loss of consciousness.  No head injury.  Patient is on Eliquis.  Denies any chest pain or shortness of breath.  No family at bedside initial exam.  Reports was initially with ambulate however having some discomfort right hip.  No acute distress alert oriented x 4.  Sensation intact.  Pulses present upper and lower.    Chief Complaint   Patient presents with    Fall    Shoulder Injury    Leg Pain     Witnessed/ Mechanical. Denies LOC or head injury. Landed on right shoulder. On eliquis       Review of Systems   Constitutional:  Negative for chills, fatigue and fever.   HENT:  Negative for congestion, rhinorrhea, sore throat, trouble swallowing and voice change.    Eyes:  Negative for photophobia, discharge, redness and visual disturbance.   Respiratory:  Negative for cough, shortness of breath and wheezing.    Cardiovascular:  Negative for chest pain and palpitations.   Gastrointestinal:  Negative for abdominal pain, diarrhea, nausea and vomiting.   Genitourinary:  Negative for dysuria, flank pain, frequency, hematuria, urgency, vaginal bleeding and vaginal discharge.   Musculoskeletal:  Negative for arthralgias, back pain, neck pain and neck stiffness.   Skin:  Negative for rash and wound.   Neurological:  Negative for dizziness, syncope, weakness, light-headedness, numbness and headaches.   Psychiatric/Behavioral:  Negative for behavioral problems and confusion.         Physical Exam  Vitals reviewed.              ------------------------- NURSING NOTES AND VITALS REVIEWED ---------------------------  Date / Time Roomed:  6/23/2024  9:47 AM  ED Bed Assignment:  0746/0746-A    The nursing notes within the ED encounter and vital signs as below have been reviewed.     Patient Vitals for the past 24 hrs:   BP Temp Temp src Pulse Resp SpO2 Height Weight   06/23/24 1430 121/60 98 °F (36.7 °C) Oral 59 18 96 % -- --   06/23/24 0949 (!) 137/59 97.8 °F (36.6 °C) -- 59 16 95 % -- --   06/23/24 0948 -- -- -- -- -- -- 1.448 m (4' 9\") 64.9 kg (143 lb)       Oxygen Saturation Interpretation: Normal    ------------------------------------------ PROGRESS NOTES ------------------------------------------  Re-evaluation(s):   Patient’s symptoms show no change  Repeat physical examination is improved    Counseling:  Staff have spoken with the patient and discussed today’s results, in addition to providing specific details for the plan of care and counseling regarding the diagnosis and prognosis.  Their questions are answered at this time and they are agreeable with the plan of admission.    --------------------------------- ADDITIONAL PROVIDER NOTES ---------------------------------  Consultations:  Spoke with Dr. Nathan Blevins.  Discussed case.  They will admit the patient.  This patient's ED course included: a personal history and physicial examination, re-evaluation prior to disposition, multiple bedside re-evaluations, IV medications, cardiac monitoring, continuous pulse oximetry, and complex medical decision making and emergency management    This patient has remained hemodynamically stable during their ED course.    Diagnosis:  1. Fall, initial encounter    2. Closed fracture of sacrum and coccyx, initial encounter ([facility])        Disposition:  Patient's disposition: Admit to telemetry  Patient's condition is stable.         Attending was present and available throughout encounter including all critical portions; See Attending

## 2024-06-23 NOTE — PROGRESS NOTES
ED to Inpatient Handoff Report    Notified Claudia that electronic handoff available and patient ready for transport to room 746.    Safety Risks: Risk of falls    Patient in Restraints: no    Constant Observer or Patient : no    Telemetry Monitoring Ordered: No          Order to transfer to unit without monitor: YES    Last MEWS: . Time completed: .    Deterioration Index: 21.45    Vitals:    06/23/24 0948 06/23/24 0949   BP:  (!) 137/59   Pulse:  59   Resp:  16   Temp:  97.8 °F (36.6 °C)   SpO2:  95%   Weight: 64.9 kg (143 lb)    Height: 1.448 m (4' 9\")        Opportunity for questions and clarification was provided.

## 2024-06-24 ENCOUNTER — APPOINTMENT (OUTPATIENT)
Dept: GENERAL RADIOLOGY | Age: 84
DRG: 552 | End: 2024-06-24
Payer: COMMERCIAL

## 2024-06-24 LAB
ALBUMIN SERPL-MCNC: 3.9 G/DL (ref 3.5–5.2)
ALP SERPL-CCNC: 90 U/L (ref 35–104)
ALT SERPL-CCNC: 21 U/L (ref 0–32)
ANION GAP SERPL CALCULATED.3IONS-SCNC: 9 MMOL/L (ref 7–16)
AST SERPL-CCNC: 26 U/L (ref 0–31)
BASOPHILS # BLD: 0.03 K/UL (ref 0–0.2)
BASOPHILS NFR BLD: 0 % (ref 0–2)
BILIRUB SERPL-MCNC: 1.5 MG/DL (ref 0–1.2)
BUN SERPL-MCNC: 17 MG/DL (ref 6–23)
CALCIUM SERPL-MCNC: 9 MG/DL (ref 8.6–10.2)
CHLORIDE SERPL-SCNC: 107 MMOL/L (ref 98–107)
CO2 SERPL-SCNC: 24 MMOL/L (ref 22–29)
CREAT SERPL-MCNC: 0.6 MG/DL (ref 0.5–1)
EKG ATRIAL RATE: 60 BPM
EKG Q-T INTERVAL: 492 MS
EKG QRS DURATION: 158 MS
EKG QTC CALCULATION (BAZETT): 492 MS
EKG R AXIS: -46 DEGREES
EKG T AXIS: 125 DEGREES
EKG VENTRICULAR RATE: 60 BPM
EOSINOPHIL # BLD: 0.22 K/UL (ref 0.05–0.5)
EOSINOPHILS RELATIVE PERCENT: 2 % (ref 0–6)
ERYTHROCYTE [DISTWIDTH] IN BLOOD BY AUTOMATED COUNT: 16.8 % (ref 11.5–15)
GFR, ESTIMATED: 88 ML/MIN/1.73M2
GLUCOSE SERPL-MCNC: 121 MG/DL (ref 74–99)
HCT VFR BLD AUTO: 38.6 % (ref 34–48)
HGB BLD-MCNC: 11.8 G/DL (ref 11.5–15.5)
IMM GRANULOCYTES # BLD AUTO: 0.04 K/UL (ref 0–0.58)
IMM GRANULOCYTES NFR BLD: 0 % (ref 0–5)
LYMPHOCYTES NFR BLD: 1.99 K/UL (ref 1.5–4)
LYMPHOCYTES RELATIVE PERCENT: 21 % (ref 20–42)
MCH RBC QN AUTO: 20.4 PG (ref 26–35)
MCHC RBC AUTO-ENTMCNC: 30.6 G/DL (ref 32–34.5)
MCV RBC AUTO: 66.7 FL (ref 80–99.9)
MONOCYTES NFR BLD: 0.82 K/UL (ref 0.1–0.95)
MONOCYTES NFR BLD: 9 % (ref 2–12)
NEUTROPHILS NFR BLD: 67 % (ref 43–80)
NEUTS SEG NFR BLD: 6.32 K/UL (ref 1.8–7.3)
PLATELET # BLD AUTO: 245 K/UL (ref 130–450)
PMV BLD AUTO: 10.7 FL (ref 7–12)
POTASSIUM SERPL-SCNC: 4.3 MMOL/L (ref 3.5–5)
PROT SERPL-MCNC: 6.6 G/DL (ref 6.4–8.3)
RBC # BLD AUTO: 5.79 M/UL (ref 3.5–5.5)
SODIUM SERPL-SCNC: 140 MMOL/L (ref 132–146)
WBC OTHER # BLD: 9.4 K/UL (ref 4.5–11.5)

## 2024-06-24 PROCEDURE — 6370000000 HC RX 637 (ALT 250 FOR IP): Performed by: INTERNAL MEDICINE

## 2024-06-24 PROCEDURE — 93010 ELECTROCARDIOGRAM REPORT: CPT | Performed by: INTERNAL MEDICINE

## 2024-06-24 PROCEDURE — 2580000003 HC RX 258: Performed by: INTERNAL MEDICINE

## 2024-06-24 PROCEDURE — 97165 OT EVAL LOW COMPLEX 30 MIN: CPT

## 2024-06-24 PROCEDURE — 97161 PT EVAL LOW COMPLEX 20 MIN: CPT

## 2024-06-24 PROCEDURE — 99221 1ST HOSP IP/OBS SF/LOW 40: CPT | Performed by: STUDENT IN AN ORGANIZED HEALTH CARE EDUCATION/TRAINING PROGRAM

## 2024-06-24 PROCEDURE — 80053 COMPREHEN METABOLIC PANEL: CPT

## 2024-06-24 PROCEDURE — 85025 COMPLETE CBC W/AUTO DIFF WBC: CPT

## 2024-06-24 PROCEDURE — 2060000000 HC ICU INTERMEDIATE R&B

## 2024-06-24 PROCEDURE — 73502 X-RAY EXAM HIP UNI 2-3 VIEWS: CPT

## 2024-06-24 PROCEDURE — 72170 X-RAY EXAM OF PELVIS: CPT

## 2024-06-24 RX ORDER — ONDANSETRON 2 MG/ML
4 INJECTION INTRAMUSCULAR; INTRAVENOUS EVERY 6 HOURS PRN
Status: DISCONTINUED | OUTPATIENT
Start: 2024-06-24 | End: 2024-06-25 | Stop reason: HOSPADM

## 2024-06-24 RX ORDER — LANOLIN ALCOHOL/MO/W.PET/CERES
3 CREAM (GRAM) TOPICAL NIGHTLY PRN
Status: DISCONTINUED | OUTPATIENT
Start: 2024-06-24 | End: 2024-06-25 | Stop reason: HOSPADM

## 2024-06-24 RX ADMIN — SODIUM CHLORIDE, PRESERVATIVE FREE 10 ML: 5 INJECTION INTRAVENOUS at 20:01

## 2024-06-24 RX ADMIN — TORSEMIDE 20 MG: 20 TABLET ORAL at 10:58

## 2024-06-24 RX ADMIN — ATORVASTATIN CALCIUM 10 MG: 10 TABLET, FILM COATED ORAL at 20:01

## 2024-06-24 RX ADMIN — APIXABAN 2.5 MG: 2.5 TABLET, FILM COATED ORAL at 20:01

## 2024-06-24 RX ADMIN — MICONAZOLE NITRATE: 20 POWDER TOPICAL at 20:00

## 2024-06-24 RX ADMIN — SODIUM CHLORIDE, PRESERVATIVE FREE 10 ML: 5 INJECTION INTRAVENOUS at 10:58

## 2024-06-24 RX ADMIN — EMPAGLIFLOZIN 10 MG: 10 TABLET, FILM COATED ORAL at 10:58

## 2024-06-24 RX ADMIN — MICONAZOLE NITRATE: 20 POWDER TOPICAL at 10:58

## 2024-06-24 RX ADMIN — APIXABAN 2.5 MG: 2.5 TABLET, FILM COATED ORAL at 10:58

## 2024-06-24 RX ADMIN — METOPROLOL SUCCINATE 25 MG: 25 TABLET, EXTENDED RELEASE ORAL at 10:58

## 2024-06-24 NOTE — PATIENT CARE CONFERENCE
P Quality Flow/Interdisciplinary Rounds Progress Note        Quality Flow Rounds held on June 24, 2024    Disciplines Attending:  Bedside Nurse, , , and Nursing Unit Leadership    Charlee Clay was admitted on 6/23/2024  9:47 AM    Anticipated Discharge Date:       Disposition:    Yadiel Score:  Yadiel Scale Score: 15    Readmission Risk              Risk of Unplanned Readmission:  14           Discussed patient goal for the day, patient clinical progression, and barriers to discharge.  The following Goal(s) of the Day/Commitment(s) have been identified:  discharge planning , awaiting pt/ot evals. Possible rehab placement, sw following. Manage pain and safety      Nayeli Moscoso RN  June 24, 2024

## 2024-06-24 NOTE — PLAN OF CARE
Problem: Discharge Planning  Goal: Discharge to home or other facility with appropriate resources  Outcome: Progressing  Flowsheets (Taken 6/23/2024 2945)  Discharge to home or other facility with appropriate resources:   Identify barriers to discharge with patient and caregiver   Identify discharge learning needs (meds, wound care, etc)     Problem: Pain  Goal: Verbalizes/displays adequate comfort level or baseline comfort level  Outcome: Progressing     Problem: Skin/Tissue Integrity  Goal: Absence of new skin breakdown  Description: 1.  Monitor for areas of redness and/or skin breakdown  2.  Assess vascular access sites hourly  3.  Every 4-6 hours minimum:  Change oxygen saturation probe site  4.  Every 4-6 hours:  If on nasal continuous positive airway pressure, respiratory therapy assess nares and determine need for appliance change or resting period.  Outcome: Progressing     Problem: Safety - Adult  Goal: Free from fall injury  Outcome: Progressing

## 2024-06-24 NOTE — H&P
Internal Medicine History & Physical     Name: Charlee Clay  : 1940  Chief Complaint: Fall, Shoulder Injury, and Leg Pain (Witnessed/ Mechanical. Denies LOC or head injury. Landed on right shoulder. On eliquis)  Primary Care Physician: Huber Ardon MD  Admission date: 2024  Date of service: 2024   Unit: [address] SURG     History of Present Illness  Charlee is a 84 y.o. year old female.  She presents to the hospital after a fall.  She states that she was trying to get out of an elevator on the morning of  and her walker got caught and she fell on her right side.  She had a right shoulder.  Nothing in particular made her symptoms better or worse.  Overall symptoms were moderate in severity.  She denies any other associated symptoms.    No family or friends were present at bedside.  History is provided by the patient.  She was felt to be a fair historian.  History is somewhat limited due to her not being a native English speaker.    ED course:   Initial blood work and imaging studies performed. Admission recommended by ED physician. My coverage discussed the case with the ED provider. Meds in the ED consisted of the following: Norco, Norflex    Past Medical History:   Diagnosis Date    AF (atrial fibrillation) (HCC) 2020    Anemia     Cancer (HCC)     BREAST     GERD (gastroesophageal reflux disease)     Mobitz type 2 second degree atrioventricular block     Osteopenia     Syncope        Past Surgical History:   Procedure Laterality Date    BREAST LUMPECTOMY Right     CARDIOVERSION  2020    Successful      DR. Dasilva    CARDIOVERSION  2023    Successful  Dr. Moses    CARDIOVERSION  2023    PACEMAKER PLACEMENT  10/12/2017    Dual chamber [employer]       Family Medical History:  Family History   Problem Relation Age of Onset    Asthma Mother     Coronary Art Dis Father        Social History  Patient lives at home with her sister.

## 2024-06-24 NOTE — CONSULTS
Department of Orthopedic Surgery  Resident Consult Note          Reason for Consult: Right hip pain, right shoulder pain    HISTORY OF PRESENT ILLNESS:       Patient is a 84 y.o. female who presents with complaints of right hip pain and right shoulder pain following mechanical fall.  She was using a walker to ambulate when she tripped and landed on her right side.  Following the fall she has had complaints of right hip pain and right shoulder pain.  No numbness or tingling to the extremities.  No chest pain or shortness of breath.  No nausea or vomiting.  She has had falls in the past and is injured the left shoulder.  She is on Eliquis 2.5 mg 2 times daily.  As mentioned she uses a walker at baseline for ambulation.    Past Medical History:        Diagnosis Date    AF (atrial fibrillation) ([facility]) 08/2020    Anemia     Cancer (HCC) 2010    BREAST     GERD (gastroesophageal reflux disease)     Mobitz type 2 second degree atrioventricular block     Osteopenia     Syncope      Past Surgical History:        Procedure Laterality Date    BREAST LUMPECTOMY Right 2010    CARDIOVERSION  08/28/2020    Successful      DR. Dasilva    CARDIOVERSION  09/20/2023    Successful  Dr. Moses    CARDIOVERSION  9/21/2023    PACEMAKER PLACEMENT  10/12/2017    Dual chamber [employer]     Current Medications:   Current Facility-Administered Medications: ondansetron (ZOFRAN) injection 4 mg, 4 mg, IntraVENous, Q6H PRN  melatonin tablet 3 mg, 3 mg, Oral, Nightly PRN  sodium chloride flush 0.9 % injection 10 mL, 10 mL, IntraVENous, 2 times per day  sodium chloride flush 0.9 % injection 10 mL, 10 mL, IntraVENous, PRN  0.9 % sodium chloride infusion, , IntraVENous, PRN  acetaminophen (TYLENOL) tablet 500 mg, 500 mg, Oral, Q6H PRN  apixaban (ELIQUIS) tablet 2.5 mg, 2.5 mg, Oral, BID  atorvastatin (LIPITOR) tablet 10 mg, 10 mg, Oral, Nightly  empagliflozin (JARDIANCE) tablet 10 mg, 10 mg, Oral, Daily  metoprolol succinate (TOPROL XL)

## 2024-06-24 NOTE — CARE COORDINATION
Pt from [facility]. Spoke with family, plan is [facility]. Spoke with abilio Reidison and she accepted. Will follow in am-mjo

## 2024-06-24 NOTE — PROGRESS NOTES
Physical Therapy  Facility/Department: [address]  Physical Therapy Initial Assessment    Name: Charlee Clay  : 1940  MRN: [id number]  Date of Service: 2024      Patient Diagnosis(es): The primary encounter diagnosis was Fall, initial encounter. A diagnosis of Closed fracture of sacrum and coccyx, initial encounter ([facility]) was also pertinent to this visit.  Past Medical History:  has a past medical history of AF (atrial fibrillation) ([facility]), Anemia, Cancer ([facility]), GERD (gastroesophageal reflux disease), Mobitz type 2 second degree atrioventricular block, Osteopenia, and Syncope.  Past Surgical History:  has a past surgical history that includes pacemaker placement (10/12/2017); Breast lumpectomy (Right, ); Cardioversion (2020); Cardioversion (2023); and cardioversion (2023).         Requires PT Follow-Up: Yes       Evaluating Therapist: Uzma Malagon PT     Referring Provider:      Tammi Evans DO       PT order : PT eval and treat     Room #: 746  DIAGNOSIS: The primary encounter diagnosis was Fall, initial encounter. A diagnosis of Closed fracture of sacrum and coccyx, initial encounter ([facility]) was also pertinent to this visit.    PRECAUTIONS: falls, WBAT     Social:  Pt lives at [facility]   Prior to admission pt walked with  ww      Initial Evaluation  Date:  2024  Treatment      Short Term/ Long Term   Goals   Was pt agreeable to Eval/treatment?  Yes      Does pt have pain?  R hip and shoulder      Bed Mobility  Rolling:  NT   Supine to sit:  mod assist   Sit to supine:  Nt   Scooting:  min assist in sit    SBA to min assist    Transfers Sit to stand:  min assist   Stand to sit: min assist   Stand pivot:  NT    SBA/CGA    Ambulation     12  feet with ww  with  min assist    50  feet with  ww  with  SBA/CGA        Stair negotiation: ascended and descended NT   N/A    LE ROM  Decreased throughout R hip, distally WFL      LE strength  3-/ 5 R hip, 3+/ 5 distally

## 2024-06-24 NOTE — PROGRESS NOTES
Occupational Therapy    OCCUPATIONAL THERAPY INITIAL EVALUATION    [facility]   [address] [place] [address], [place]         Date:2024                                                  Patient Name: Charlee Clay    MRN: [id number]    : 1940    Room: [address] [place] [address]      Evaluating OT: Rachelle Angeles OTR/L   [id number]      Referring Provider:Tammi Evans DO     Specific Provider Orders/Date:OT eval and treat 2024      Diagnosis:  Fall, initial encounter [W19.XXXA]  Sacrum and coccyx fracture, closed, initial encounter ([facility]) [S32.10XA, S32.2XXA]  Closed fracture dislocation of pelvis, initial encounter ([facility]) [S32.9XXA]   Per ortho note:     IMPRESSION:   Right junctional superior pubic rami fracture, right inferior.  Rami fracture  Right zone 2 fracture of sacrum into S1 foramen  Right shoulder pain, osteoarthritis, given that passive motion is well-tolerated, there is lower suspicion of any acute fractures that may be occult given her underlying osteoarthritis   PLAN:    do not anticipate the need for any surgical intervention with respect to the known fractures. We will allow her to weight-bear as tolerated on bilateral lower extremities. She will require a walker for ambulation.     Pertinent Medical History: A fib, syncope, pacemaker      Precautions:  Fall Risk, WBAT B LEs,      Assessment of current deficits    [x] Functional mobility  [x]ADLs  [x] Strength               []Cognition    [x] Functional transfers   [x] IADLs         [x] Safety Awareness   [x]Endurance    [] Fine Coordination              [x] Balance      [] Vision/perception   [x]Sensation     []Gross Motor Coordination  [] ROM  [] Delirium                   [] Motor Control     OT PLAN OF CARE   OT POC based on physician orders, patient diagnosis and results of clinical assessment    Frequency/Duration  2-4 days/wk for 2 - 4weeks PRN   Specific OT Treatment Interventions to include:  observed  Good  with ADL activity    Visual/  Perceptual Glasses: none by bedside         UE ROM/strength  R shoulder pain, patient holdig it guarded ,  AAROM - approx 80 degrees , distally WFLs  L UE WFLs  Tolerate UE therapeutic activity/exercises to increase strength/endurance for ADL/xfer activity       Hand Dominance right    Hearing: WFL   Sensation:  No c/o numbness or tingling   Tone: WFL   Edema: none observed     Comments: Upon arrival patient lying in bed .  At end of session, patient sitting in chair  with call light and phone within reach, all lines and tubes intact.  *ALARM ON , sister present    Overall patient demonstrated  decreased independence and safety during completion of ADL/functional transfer/mobility tasks.  Pt would benefit from continued skilled OT to increase safety and independence with completion of ADL/IADL tasks for functional independence and quality of life.        Rehab Potential: good for established goals     Patient / Family Goal: rehab       Patient and/or family were instructed on functional diagnosis, prognosis/goals and OT plan of care. Demonstrated good  understanding.     Eval Complexity: Low    Time In: 1235  Time Out: 1250      Min Units   OT Eval Low 97165 x  1   OT Eval Medium 69041      OT Eval High 75046      OT Re-Eval 79057       Therapeutic Ex 66234      Therapeutic Activities 46382       ADL/Self Care 92132      Orthotic Management 88963       Manual 25350     Neuro Re-Ed 29396       Non-Billable Time      OT Re eval 12207        Evaluation Time additionally includes thorough review of current medical information, gathering information on past medical history/social history and prior level of function, interpretation of standardized testing/informal observation of tasks, assessment of data and development of plan of care and goals.            Rachelle Angeles  OTR/L  OT 160730

## 2024-06-24 NOTE — DISCHARGE INSTRUCTIONS
[facility] Department of Orthopedic Surgery  [facility]  [address] [address]            Orthopaedics Discharge Instructions   Weight bearing Status - Weight bearing as tolerated - Right Lower Extremity and Right Upper Extremity with Walker  Pain medication Per Prescriptions  Contact Office for Medication Refill- [phone]   Office can refill pain med every 7 days  If patient discharging to facility then pain control will be continued per facility physician  Ice to operative/injured site for 15-30 minutes of each hour for next 5 days  ( do not soak the wound)    Recommend that you continue to ice the area 2-3 times per day after this   Elevate operative/injured limb on 2 pillows at home  Goal is to have limb above the heart if able  Continue DVT Prophylaxis (blood clot prevention) as Prescribed: Eliquis 2.5 mg two times daily      Follow Up in Office in 2 weeks.     Call the office at [phone] or directions or with any questions.  Watch for these significant complications.  Call physician if they or any other problems occur:  Fever over 101°, redness, swelling or warmth at the operative site  Unrelieved nausea   Chest pain or shortness of breath   Foul smelling or cloudy drainage at the operative site   Unrelieved pain    Blood soaked dressing. (Some oozing may be normal)     Numb, pale, blue, cold or tingling extremity    Future Appointments   Date Time Provider Department Center   7/2/2024 10:30 AM [facility] SPECIAL PROCEDURES LAB [facility] Rad/Car   8/27/2024  9:40 AM Jacqui Hidalgo MD [facility]   9/5/2024 11:30 AM Cindi Haro, APRN - CNP ELECTRO PHYS [facility]   9/5/2024 11:30 AM SCHEDULE, DEVICE CLINIC 1 [facility] ELECTRO PHYS [facility]         It is the Department of Orthopaedic's standard of practice that providers will de-escalate(wean) all patients from narcotic(opioid) medications during the post-operative period.   We provide multimodal pain control but opioid

## 2024-06-25 VITALS
SYSTOLIC BLOOD PRESSURE: 136 MMHG | TEMPERATURE: 97.9 F | DIASTOLIC BLOOD PRESSURE: 65 MMHG | BODY MASS INDEX: 30.85 KG/M2 | RESPIRATION RATE: 16 BRPM | HEIGHT: 57 IN | HEART RATE: 60 BPM | WEIGHT: 143 LBS | OXYGEN SATURATION: 96 %

## 2024-06-25 PROCEDURE — 97530 THERAPEUTIC ACTIVITIES: CPT

## 2024-06-25 PROCEDURE — 97535 SELF CARE MNGMENT TRAINING: CPT

## 2024-06-25 PROCEDURE — 2580000003 HC RX 258: Performed by: INTERNAL MEDICINE

## 2024-06-25 PROCEDURE — 6370000000 HC RX 637 (ALT 250 FOR IP): Performed by: INTERNAL MEDICINE

## 2024-06-25 RX ORDER — HYDROCODONE BITARTRATE AND ACETAMINOPHEN 5; 325 MG/1; MG/1
1 TABLET ORAL EVERY 6 HOURS PRN
Qty: 12 TABLET | Refills: 0 | Status: SHIPPED | OUTPATIENT
Start: 2024-06-25 | End: 2024-06-28

## 2024-06-25 RX ORDER — METOPROLOL SUCCINATE 25 MG/1
25 TABLET, EXTENDED RELEASE ORAL DAILY
Qty: 30 TABLET | Refills: 0 | DISCHARGE
Start: 2024-06-25

## 2024-06-25 RX ADMIN — SODIUM CHLORIDE, PRESERVATIVE FREE 10 ML: 5 INJECTION INTRAVENOUS at 09:20

## 2024-06-25 RX ADMIN — METOPROLOL SUCCINATE 25 MG: 25 TABLET, EXTENDED RELEASE ORAL at 09:20

## 2024-06-25 RX ADMIN — TORSEMIDE 20 MG: 20 TABLET ORAL at 09:20

## 2024-06-25 RX ADMIN — MICONAZOLE NITRATE: 20 POWDER TOPICAL at 09:20

## 2024-06-25 RX ADMIN — ACETAMINOPHEN 500 MG: 500 TABLET ORAL at 09:10

## 2024-06-25 RX ADMIN — EMPAGLIFLOZIN 10 MG: 10 TABLET, FILM COATED ORAL at 09:20

## 2024-06-25 RX ADMIN — APIXABAN 2.5 MG: 2.5 TABLET, FILM COATED ORAL at 09:20

## 2024-06-25 ASSESSMENT — PAIN DESCRIPTION - LOCATION: LOCATION: PELVIS;SHOULDER

## 2024-06-25 ASSESSMENT — PAIN SCALES - GENERAL: PAINLEVEL_OUTOF10: 6

## 2024-06-25 ASSESSMENT — PAIN DESCRIPTION - ORIENTATION: ORIENTATION: RIGHT

## 2024-06-25 NOTE — PLAN OF CARE
Problem: Discharge Planning  Goal: Discharge to home or other facility with appropriate resources  6/25/2024 1323 by Jhonny Huston, RN  Outcome: Completed     Problem: Pain  Goal: Verbalizes/displays adequate comfort level or baseline comfort level  6/25/2024 1323 by Jhonny Huston, RN  Outcome: Completed     Problem: Skin/Tissue Integrity  Goal: Absence of new skin breakdown  Description: 1.  Monitor for areas of redness and/or skin breakdown  2.  Assess vascular access sites hourly  3.  Every 4-6 hours minimum:  Change oxygen saturation probe site  4.  Every 4-6 hours:  If on nasal continuous positive airway pressure, respiratory therapy assess nares and determine need for appliance change or resting period.  6/25/2024 1323 by Jhonny Huston, RN  Outcome: Completed     Problem: Safety - Adult  Goal: Free from fall injury  6/25/2024 1323 by Jhonny Huston, RN  Outcome: Completed

## 2024-06-25 NOTE — ACP (ADVANCE CARE PLANNING)
Advance Care Planning   Healthcare Decision Maker:    Primary Decision Maker: Cate Menendez - Brother/Sister - [phone]    Click here to complete Healthcare Decision Makers including selection of the Healthcare Decision Maker Relationship (ie \"Primary\").

## 2024-06-25 NOTE — PLAN OF CARE
Problem: Discharge Planning  Goal: Discharge to home or other facility with appropriate resources  6/25/2024 1322 by Jhonny Huston RN  Outcome: Progressing     Problem: Pain  Goal: Verbalizes/displays adequate comfort level or baseline comfort level  6/25/2024 1322 by Jhonny Huston RN  Outcome: Progressing     Problem: Skin/Tissue Integrity  Goal: Absence of new skin breakdown  Description: 1.  Monitor for areas of redness and/or skin breakdown  2.  Assess vascular access sites hourly  3.  Every 4-6 hours minimum:  Change oxygen saturation probe site  4.  Every 4-6 hours:  If on nasal continuous positive airway pressure, respiratory therapy assess nares and determine need for appliance change or resting period.  6/25/2024 1322 by Jhonny Huston RN  Outcome: Progressing     Problem: Safety - Adult  Goal: Free from fall injury  6/25/2024 1322 by Jhonny Huston RN  Outcome: Progressing     Problem: ABCDS Injury Assessment  Goal: Absence of physical injury  6/25/2024 1322 by Jhonny Huston RN  Outcome: Progressing

## 2024-06-25 NOTE — PROGRESS NOTES
SP[facility]UAL [facility] - [facility] Pham Encounter    Name: Charlee Clay                  Referral: Routine Visit    Sacraments  Anointed (Last Rites): No  Apostolic [place]: No  Confession: No  Communion: No     Assessment:  Patient had already been  discharged.      Intervention:  None.     Outcome:  None.    Plan:  None.      Electronically signed by Chaplain Alfreda, on 6/25/2024 at 7:23 PM.  Spiritual Care Department  [facility]  [phone]

## 2024-06-25 NOTE — PLAN OF CARE
Problem: Discharge Planning  Goal: Discharge to home or other facility with appropriate resources  6/25/2024 0118 by Dav Aleman RN  Outcome: Progressing  6/24/2024 1820 by Beharry, Phyllis, RN  Outcome: Progressing     Problem: Pain  Goal: Verbalizes/displays adequate comfort level or baseline comfort level  6/25/2024 0118 by Dav Aleman RN  Outcome: Progressing  6/24/2024 1820 by Beharry, Phyllis, RN  Outcome: Progressing     Problem: Skin/Tissue Integrity  Goal: Absence of new skin breakdown  Description: 1.  Monitor for areas of redness and/or skin breakdown  2.  Assess vascular access sites hourly  3.  Every 4-6 hours minimum:  Change oxygen saturation probe site  4.  Every 4-6 hours:  If on nasal continuous positive airway pressure, respiratory therapy assess nares and determine need for appliance change or resting period.  6/25/2024 0118 by Dav Aleman RN  Outcome: Progressing  6/24/2024 1820 by Beharry, Phyllis, RN  Outcome: Progressing     Problem: Safety - Adult  Goal: Free from fall injury  6/25/2024 0118 by Dav Aleman RN  Outcome: Progressing  6/24/2024 1820 by Beharry, Phyllis, RN  Outcome: Progressing     Problem: ABCDS Injury Assessment  Goal: Absence of physical injury  6/25/2024 0118 by Dav Aleman RN  Outcome: Progressing  6/24/2024 1820 by Beharry, Phyllis, RN  Outcome: Progressing

## 2024-06-25 NOTE — DISCHARGE SUMMARY
Internal Medicine Discharge Summary    NAME: Charlee Clay :  1940  MRN:  [id number] PCP:Huber Ardon MD    ADMITTED: 2024   DISCHARGED: 2024  1:17 PM    ADMITTING PHYSICIAN: Gen    PCP: Huber Ardon MD    CONSULTANT(S):   IP CONSULT TO INTERNAL MEDICINE  IP CONSULT TO SOCIAL WORK  IP CONSULT TO ORTHOPEDIC SURGERY     ADMITTING DIAGNOSIS:   Fall, initial encounter [W19.XXXA]  Sacrum and coccyx fracture, closed, initial encounter (HCC) [S32.10XA, S32.2XXA]  Closed fracture dislocation of pelvis, initial encounter (HCC) [S32.9XXA]     Please see H&P for further details    DISCHARGE DIAGNOSES:   Active Hospital Problems    Diagnosis     Fx sacrum/coccyx-closed (HCC) [S32.10XA, S32.2XXA]      Priority: High    Closed fracture dislocation of pelvis, initial encounter (HCC) [S32.9XXA]      Priority: Medium    Hyperlipidemia [E78.5]     Obesity with body mass index 30 or greater [E66.9]     Difficulty walking [R26.2]     Chronic constipation [K59.09]     SSS (sick sinus syndrome) (HCC) [I49.5]     Atrial fibrillation (HCC) [I48.91]     Fall [W19.XXXA]     Osteoporosis [M81.0]        BRIEF HISTORY OF PRESENT ILLNESS: Charlee Clay is a 84 y.o. female patient of Huber Ardon MD who  has a past medical history of AF (atrial fibrillation) ([facility]), Anemia, Cancer (HCC), GERD (gastroesophageal reflux disease), Mobitz type 2 second degree atrioventricular block, Osteopenia, and Syncope. who originally had concerns including Fall, Shoulder Injury, and Leg Pain (Witnessed/ Mechanical. Denies LOC or head injury. Landed on right shoulder. On eliquis). at presentation on 2024, and was found to have Fall, initial encounter [W19.XXXA]  Sacrum and coccyx fracture, closed, initial encounter (HCC) [S32.10XA, S32.2XXA]  Closed fracture dislocation of pelvis, initial encounter (HCC) [S32.9XXA] after workup.    Please see H&P for further details.    HOSPITAL COURSE:   The patient presented to  condition->Emergency Medical Condition (MA), FINDINGS: There is no recent parenchymal or extra axial intracranial hemorrhage, mass effect or midline shift.  No obvious acute territorial infarct.  No hydrocephalus.  No displaced or depressed calvarial fracture is seen. The density in the superior sagittal sinus is normal.  There is no large extracranial hematoma. There is moderate atrophy as previously.  Orbital contents are symmetric and unremarkable.  Visualized paranasal sinuses and mastoids are clear.     No intracranial acute posttraumatic abnormality. .     CT CSpine W/O Contrast    Result Date: 6/23/2024  EXAMINATION: CT OF THE CERVICAL SPINE WITHOUT CONTRAST 6/23/2024 10:36 am TECHNIQUE: CT of the cervical spine was performed without the administration of intravenous contrast. Multiplanar reformatted images are provided for review. Automated exposure control, iterative reconstruction, and/or weight based adjustment of the mA/kV was utilized to reduce the radiation dose to as low as reasonably achievable. COMPARISON: None. HISTORY: ORDERING SYSTEM PROVIDED HISTORY: fall, walker was pulled in elevator and patien fell to right side, no LOC, no chest pain, on eliquis TECHNOLOGIST PROVIDED HISTORY: Reason for exam:->fall, walker was pulled in elevator and patien fell to right side, no LOC, no chest pain, on eliquis Decision Support Exception - unselect if not a suspected or confirmed emergency medical condition->Emergency Medical Condition (MA) FINDINGS: The ring of C1 is intact as is the dense.  There is no compression fracture of the cervical spine.  No jumped or perched facet is noted. Multilevel degenerative disc and degenerative joint disease is noted. The prevertebral soft tissues are unremarkable.  The airway is widely patent. Images through the lung apices are negative for a pneumothorax.     1. There is no acute compression fracture or subluxation of the cervical spine. 2. Multilevel degenerative disc and

## 2024-06-25 NOTE — PATIENT CARE CONFERENCE
[facility] Quality Flow/Interdisciplinary Rounds Progress Note        Quality Flow Rounds held on June 25, 2024    Disciplines Attending:  Bedside Nurse, , , and Nursing Unit Leadership    Charlee Clay was admitted on 6/23/2024  9:47 AM    Anticipated Discharge Date:       Disposition:    Yadiel Score:  Yadiel Scale Score: 19    Readmission Risk              Risk of Unplanned Readmission:  14           Discussed patient goal for the day, patient clinical progression, and barriers to discharge.  The following Goal(s) of the Day/Commitment(s) have been identified:  discharge planning for tammi thomas at noon. Manage pain and safety      Nayeli Moscoso RN  June 25, 2024

## 2024-06-25 NOTE — PROGRESS NOTES
06/24/24 1058    metoprolol succinate (TOPROL XL) extended release tablet 25 mg  25 mg Oral Daily Tammi Evans DO   25 mg at 06/24/24 1058    torsemide (DEMADEX) tablet 20 mg  20 mg Oral Daily Tammi Evans DO   20 mg at 06/24/24 1058    HYDROcodone-acetaminophen (NORCO) 5-325 MG per tablet 1 tablet  1 tablet Oral Q6H PRN Tammi Evans DO        miconazole (MICOTIN) 2 % powder   Topical BID Cas Blevins DO   Given at 06/24/24 2000       PRN Medications  ondansetron, melatonin, sodium chloride flush, sodium chloride, acetaminophen, HYDROcodone 5 mg - acetaminophen    Objective  Most Recent Recorded Vitals  /65   Pulse 60   Temp 97.9 °F (36.6 °C) (Oral)   Resp 16   Ht 1.448 m (4' 9\")   Wt 64.9 kg (143 lb)   SpO2 98%   BMI 30.94 kg/m²   I/O last 3 completed shifts:  In: 8 [I.V.:8]  Out: 1650 [Urine:1650]  No intake/output data recorded.    Physical Exam:  General: AAO to person/place/time/purpose, NAD, no labored breathing  Eyes: conjunctivae/corneas clear, sclera non icteric  Skin: color/texture/turgor normal, no rashes or lesions  Lungs: CTAB, no retractions/use of accessory muscles, no vocal fremitus, no rhonchi, no crackle, no rales  Heart: regular rate, regular rhythm, no murmur  Abdomen: soft, NT, bowel sounds normal  Extremities: atraumatic, no edema, pain with movement of her lower  Neurologic: cranial nerves 2-12 grossly intact, no slurred speech    Most Recent Labs  Lab Results   Component Value Date    WBC 9.4 06/24/2024    HGB 11.8 06/24/2024    HCT 38.6 06/24/2024     06/24/2024     06/24/2024    K 4.3 06/24/2024     06/24/2024    CREATININE 0.6 06/24/2024    BUN 17 06/24/2024    CO2 24 06/24/2024    GLUCOSE 121 (H) 06/24/2024    ALT 21 06/24/2024    AST 26 06/24/2024    INR 1.1 10/12/2017    TSH 1.100 10/11/2017       XR HIP RIGHT (2-3 VIEWS)   Final Result   Nondisplaced right sacral and right pubic rami fractures again present.         XR PELVIS INLET

## 2024-06-25 NOTE — PROGRESS NOTES
Occupational Therapy  OT BEDSIDE TREATMENT NOTE      Date:2024  Patient Name: Charlee Clay  MRN: [id number]  : 1940  Room: [address] [place] [address]         Evaluating OT: Rachelle Angeles OTR/L   [id number]       Referring Provider:Tammi Evans DO     Specific Provider Orders/Date:OT eval and treat 2024       Diagnosis:  Fall, initial encounter [W19.XXXA]  Sacrum and coccyx fracture, closed, initial encounter ([facility]) [S32.10XA, S32.2XXA]  Closed fracture dislocation of pelvis, initial encounter ([facility]) [S32.9XXA]   Per ortho note:     IMPRESSION:   Right junctional superior pubic rami fracture, right inferior.  Rami fracture  Right zone 2 fracture of sacrum into S1 foramen  Right shoulder pain, osteoarthritis, given that passive motion is well-tolerated, there is lower suspicion of any acute fractures that may be occult given her underlying osteoarthritis   PLAN:    do not anticipate the need for any surgical intervention with respect to the known fractures. We will allow her to weight-bear as tolerated on bilateral lower extremities. She will require a walker for ambulation.     Pertinent Medical History: A fib, syncope, pacemaker      Precautions:  Fall Risk, WBAT B LE       Assessment of current deficits    [x] Functional mobility            [x]ADLs           [x] Strength                  []Cognition    [x] Functional transfers          [x] IADLs         [x] Safety Awareness   [x]Endurance    [] Fine Coordination                         [x] Balance      [] Vision/perception   [x]Sensation      []Gross Motor Coordination             [] ROM           [] Delirium                   [] Motor Control      OT PLAN OF CARE   OT POC based on physician orders, patient diagnosis and results of clinical assessment     Frequency/Duration  2-4 days/wk for 2 - 4weeks PRN   Specific OT Treatment Interventions to include:   ADL retraining/adapted techniques and AE recommendations to increase functional independence within

## 2024-06-26 LAB
MICROORGANISM SPEC CULT: ABNORMAL
SERVICE CMNT-IMP: ABNORMAL
SPECIMEN DESCRIPTION: ABNORMAL

## 2024-06-27 ENCOUNTER — PREP FOR PROCEDURE (OUTPATIENT)
Dept: NON INVASIVE DIAGNOSTICS | Age: 84
End: 2024-06-27

## 2024-06-27 ENCOUNTER — TELEPHONE (OUTPATIENT)
Dept: NON INVASIVE DIAGNOSTICS | Age: 84
End: 2024-06-27

## 2024-06-27 NOTE — TELEPHONE ENCOUNTER
Patient's niece called to r/s DCCV to 7/25/24 due to patient falling and breaking her pelvis.  Patient is doing ok but is non weight bearing at this time.  If patient needs to r/s DCCV again Jessenia will call office.

## 2024-07-11 ENCOUNTER — OFFICE VISIT (OUTPATIENT)
Dept: ORTHOPEDIC SURGERY | Age: 84
End: 2024-07-11
Payer: COMMERCIAL

## 2024-07-11 VITALS — BODY MASS INDEX: 28.91 KG/M2 | HEIGHT: 57 IN | WEIGHT: 134 LBS

## 2024-07-11 DIAGNOSIS — S32.9XXA: Primary | ICD-10-CM

## 2024-07-11 PROCEDURE — G8399 PT W/DXA RESULTS DOCUMENT: HCPCS | Performed by: STUDENT IN AN ORGANIZED HEALTH CARE EDUCATION/TRAINING PROGRAM

## 2024-07-11 PROCEDURE — G8427 DOCREV CUR MEDS BY ELIG CLIN: HCPCS | Performed by: STUDENT IN AN ORGANIZED HEALTH CARE EDUCATION/TRAINING PROGRAM

## 2024-07-11 PROCEDURE — 1123F ACP DISCUSS/DSCN MKR DOCD: CPT | Performed by: STUDENT IN AN ORGANIZED HEALTH CARE EDUCATION/TRAINING PROGRAM

## 2024-07-11 PROCEDURE — 1111F DSCHRG MED/CURRENT MED MERGE: CPT | Performed by: STUDENT IN AN ORGANIZED HEALTH CARE EDUCATION/TRAINING PROGRAM

## 2024-07-11 PROCEDURE — 1036F TOBACCO NON-USER: CPT | Performed by: STUDENT IN AN ORGANIZED HEALTH CARE EDUCATION/TRAINING PROGRAM

## 2024-07-11 PROCEDURE — 99213 OFFICE O/P EST LOW 20 MIN: CPT | Performed by: STUDENT IN AN ORGANIZED HEALTH CARE EDUCATION/TRAINING PROGRAM

## 2024-07-11 PROCEDURE — G8417 CALC BMI ABV UP PARAM F/U: HCPCS | Performed by: STUDENT IN AN ORGANIZED HEALTH CARE EDUCATION/TRAINING PROGRAM

## 2024-07-11 PROCEDURE — 1090F PRES/ABSN URINE INCON ASSESS: CPT | Performed by: STUDENT IN AN ORGANIZED HEALTH CARE EDUCATION/TRAINING PROGRAM

## 2024-07-11 RX ORDER — POTASSIUM CHLORIDE 20 MEQ/1
20 TABLET, EXTENDED RELEASE ORAL 2 TIMES DAILY
COMMUNITY

## 2024-07-11 NOTE — PROGRESS NOTES
right-sided superior and inferior pubic rami and sacral ala fracture unchanged from previous studies      ASSESSMENT  Right LC 1 pelvic ring injury    PLAN  Weightbearing as tolerated right lower extremity  Continue PT OT  Plan for follow-up in 6 to 8 weeks with repeat evaluation and x-rays at that point    E/M EST Level 3- Greater than 20 minutes were spent with patient including history, exam, review of imaging (not including taking), discussion of diagnosis and treatment options, answering questions, and documentation.           Fabio Donnelly DO   Orthopaedic Surgery   7/11/24  11:58 AM

## 2024-07-12 NOTE — PROGRESS NOTES
Physician Progress Note      PATIENT:               ELLIE SAGE  [facility] #:                  [id number]  :                       1940  ADMIT DATE:       2024 9:47 AM  DISCH DATE:        2024 1:17 PM  RESPONDING  PROVIDER #:        CHAPITO CLEARY DO          QUERY TEXT:    Pt admitted with sacral and pubic ramus fractures after a fall. Pt noted to   have osteopenia in R hip xray on  and osteoporosis noted in active problem   list. If possible, please document in progress notes and discharge summary if   you are evaluating and/or treating any of the following:    The medical record reflects the following:  Risk Factors: Female, age 84  Clinical Indicators: Per R hip xray ofn  \"...Osteopenia...\", Per DCS   \"...Sacral and pubic ramus fracture after fall...\"  Treatment: CT hip and cervical spine, CT R hip and pelvis, ortho consult    Thank you,  Kamala Gallo, RN, BSN, CDIS  Clinical Documentation Integrity  [email or web address]  Options provided:  -- Pathological sacral and pubic ramus fractures due to osteopenia  -- Pathological sacral and pubic ramus fractures due to osteopenia following   fall which would not usually break a normal, healthy bone  -- Osteoporotic sacral and pubic ramus fractures  -- Osteoporotic sacral and pubic ramus fractures following fall which would   not usually break a normal, healthy bone  -- Traumatic sacral and pubic ramus fractures  -- Other - I will add my own diagnosis  -- Disagree - Not applicable / Not valid  -- Disagree - Clinically unable to determine / Unknown  -- Refer to Clinical Documentation Reviewer    PROVIDER RESPONSE TEXT:    This patient has a pathological sacral and pubic ramus fractures due to   osteopenia following fall which would not usually break a normal, healthy   bone.    Query created by: Kamala Gallo on 2024 12:10 PM      Electronically signed by:  CHAPITO CLEARY DO 2024 10:14 PM

## 2024-07-17 ENCOUNTER — TELEPHONE (OUTPATIENT)
Dept: NON INVASIVE DIAGNOSTICS | Age: 84
End: 2024-07-17

## 2024-07-17 NOTE — TELEPHONE ENCOUNTER
[facility] - called to get instructions on upcoming DCCV for the patient. Instructions faxed to [phone].     Electronically signed by Mildais Urbano MA on 7/17/2024 at 8:25 AM

## 2024-07-24 RX ORDER — SODIUM CHLORIDE 0.9 % (FLUSH) 0.9 %
5-40 SYRINGE (ML) INJECTION EVERY 12 HOURS SCHEDULED
Status: CANCELLED | OUTPATIENT
Start: 2024-07-24

## 2024-07-24 RX ORDER — SODIUM CHLORIDE 9 MG/ML
INJECTION, SOLUTION INTRAVENOUS PRN
Status: CANCELLED | OUTPATIENT
Start: 2024-07-24

## 2024-07-24 RX ORDER — SODIUM CHLORIDE 0.9 % (FLUSH) 0.9 %
5-40 SYRINGE (ML) INJECTION PRN
Status: CANCELLED | OUTPATIENT
Start: 2024-07-24

## 2024-07-25 ENCOUNTER — ANESTHESIA (OUTPATIENT)
Age: 84
End: 2024-07-25
Payer: COMMERCIAL

## 2024-07-25 ENCOUNTER — HOSPITAL ENCOUNTER (OUTPATIENT)
Age: 84
Discharge: HOME | End: 2024-07-27
Attending: STUDENT IN AN ORGANIZED HEALTH CARE EDUCATION/TRAINING PROGRAM
Payer: COMMERCIAL

## 2024-07-25 ENCOUNTER — ANESTHESIA EVENT (OUTPATIENT)
Age: 84
End: 2024-07-25
Payer: COMMERCIAL

## 2024-07-25 VITALS
SYSTOLIC BLOOD PRESSURE: 133 MMHG | TEMPERATURE: 97.9 F | RESPIRATION RATE: 16 BRPM | DIASTOLIC BLOOD PRESSURE: 64 MMHG | OXYGEN SATURATION: 95 % | HEART RATE: 66 BPM

## 2024-07-25 DIAGNOSIS — I48.3 TYPICAL ATRIAL FLUTTER (HCC): ICD-10-CM

## 2024-07-25 LAB
ANION GAP SERPL CALCULATED.3IONS-SCNC: 12 MMOL/L (ref 7–16)
BUN SERPL-MCNC: 17 MG/DL (ref 6–23)
CALCIUM SERPL-MCNC: 9.6 MG/DL (ref 8.6–10.2)
CHLORIDE SERPL-SCNC: 103 MMOL/L (ref 98–107)
CO2 SERPL-SCNC: 29 MMOL/L (ref 22–29)
CREAT SERPL-MCNC: 0.6 MG/DL (ref 0.5–1)
ERYTHROCYTE [DISTWIDTH] IN BLOOD BY AUTOMATED COUNT: 18.5 % (ref 11.5–15)
GFR, ESTIMATED: 89 ML/MIN/1.73M2
GLUCOSE SERPL-MCNC: 100 MG/DL (ref 74–99)
HCT VFR BLD AUTO: 44.7 % (ref 34–48)
HGB BLD-MCNC: 13.5 G/DL (ref 11.5–15.5)
MCH RBC QN AUTO: 20 PG (ref 26–35)
MCHC RBC AUTO-ENTMCNC: 30.2 G/DL (ref 32–34.5)
MCV RBC AUTO: 66.3 FL (ref 80–99.9)
PLATELET # BLD AUTO: 275 K/UL (ref 130–450)
PMV BLD AUTO: 10.9 FL (ref 7–12)
POTASSIUM SERPL-SCNC: 4.5 MMOL/L (ref 3.5–5)
RBC # BLD AUTO: 6.74 M/UL (ref 3.5–5.5)
SODIUM SERPL-SCNC: 144 MMOL/L (ref 132–146)
WBC OTHER # BLD: 6.8 K/UL (ref 4.5–11.5)

## 2024-07-25 PROCEDURE — 85027 COMPLETE CBC AUTOMATED: CPT

## 2024-07-25 PROCEDURE — 2580000003 HC RX 258: Performed by: NURSE ANESTHETIST, CERTIFIED REGISTERED

## 2024-07-25 PROCEDURE — 92960 CARDIOVERSION ELECTRIC EXT: CPT

## 2024-07-25 PROCEDURE — 80048 BASIC METABOLIC PNL TOTAL CA: CPT

## 2024-07-25 PROCEDURE — 7100000011 HC PHASE II RECOVERY - ADDTL 15 MIN: Performed by: STUDENT IN AN ORGANIZED HEALTH CARE EDUCATION/TRAINING PROGRAM

## 2024-07-25 PROCEDURE — 6360000002 HC RX W HCPCS: Performed by: NURSE ANESTHETIST, CERTIFIED REGISTERED

## 2024-07-25 PROCEDURE — 3700000000 HC ANESTHESIA ATTENDED CARE: Performed by: STUDENT IN AN ORGANIZED HEALTH CARE EDUCATION/TRAINING PROGRAM

## 2024-07-25 PROCEDURE — 7100000010 HC PHASE II RECOVERY - FIRST 15 MIN: Performed by: STUDENT IN AN ORGANIZED HEALTH CARE EDUCATION/TRAINING PROGRAM

## 2024-07-25 RX ORDER — SODIUM CHLORIDE 0.9 % (FLUSH) 0.9 %
5-40 SYRINGE (ML) INJECTION EVERY 12 HOURS SCHEDULED
Status: DISCONTINUED | OUTPATIENT
Start: 2024-07-25 | End: 2024-07-28 | Stop reason: HOSPADM

## 2024-07-25 RX ORDER — SODIUM CHLORIDE 9 MG/ML
INJECTION, SOLUTION INTRAVENOUS PRN
Status: DISCONTINUED | OUTPATIENT
Start: 2024-07-25 | End: 2024-07-28 | Stop reason: HOSPADM

## 2024-07-25 RX ORDER — PROPOFOL 10 MG/ML
INJECTION, EMULSION INTRAVENOUS PRN
Status: DISCONTINUED | OUTPATIENT
Start: 2024-07-25 | End: 2024-07-25 | Stop reason: SDUPTHER

## 2024-07-25 RX ORDER — SODIUM CHLORIDE 9 MG/ML
INJECTION, SOLUTION INTRAVENOUS CONTINUOUS PRN
Status: DISCONTINUED | OUTPATIENT
Start: 2024-07-25 | End: 2024-07-25 | Stop reason: SDUPTHER

## 2024-07-25 RX ORDER — SODIUM CHLORIDE 0.9 % (FLUSH) 0.9 %
5-40 SYRINGE (ML) INJECTION PRN
Status: DISCONTINUED | OUTPATIENT
Start: 2024-07-25 | End: 2024-07-28 | Stop reason: HOSPADM

## 2024-07-25 RX ADMIN — PROPOFOL 50 MG: 10 INJECTION, EMULSION INTRAVENOUS at 11:43

## 2024-07-25 RX ADMIN — SODIUM CHLORIDE: 9 INJECTION, SOLUTION INTRAVENOUS at 11:38

## 2024-07-25 NOTE — ANESTHESIA PRE PROCEDURE
COVID19 Not Detected 08/24/2020 12:00 PM     EKG 08/02/23  Electronic ventricular pacemaker   Pacemaker ECG, No further analysis   INSUFFICIENT DATA      ECHO 09/08/23   . Summary   Left ventricle size is normal.   Ejection fraction is visually estimated at 60-65%. Atrial fibrillation may   affect the evaluation of LV systolic function.   No regional wall motion abnormalities seen.   Mild concentric left ventricular hypertrophy.   Abnormal diastolic function with an indeterminate grade (E/e' >11).   The left atrium is severely dilated.   Normal right ventricular size and function.   Mild mitral regurgitation is present.   No hemodynamically significant aortic stenosis is present.   Mild aortic regurgitation is noted.   RVSP is 36 mmHg. Normal estimated PA systolic pressure.   No evidence for hemodynamically significant pericardial effusion.       Anesthesia Evaluation     no history of anesthetic complications:   Airway: Mallampati: I  TM distance: >3 FB   Neck ROM: full  Mouth opening: > = 3 FB   Dental:    (+) upper dentures and lower dentures      Pulmonary:                              Cardiovascular:  Exercise tolerance: poor (<4 METS)  (+) pacemaker:, dysrhythmias: atrial fibrillation               ROS comment: Mobitz type II AVB    Hx Syncope and collapse        Neuro/Psych:   (+) neuromuscular disease:            GI/Hepatic/Renal:   (+) GERD:          Endo/Other:    (+) blood dyscrasia: anemia:., malignancy/cancer (Breast ca).                  ROS comment: Microcytic anemia  Abdominal:             Vascular:          Other Findings:             Anesthesia Plan      MAC     ASA 3       Induction: intravenous.      Anesthetic plan and risks discussed with patient.      Plan discussed with attending.                    GIANFRANCO Arriaga - CRNA   7/25/2024

## 2024-07-25 NOTE — H&P
infusion   IntraVENous PRN Aaron Moses DO            No Known Allergies    ROS:   Constitutional: Negative for fever, activity change and appetite change.   HENT: Negative for epistaxis.   Eyes: Negative for diploplia, blurred vision.   Respiratory: Negative for cough, chest tightness, shortness of breath and wheezing.   Cardiovascular: pertinent positives in HPI  Gastrointestinal: Negative for abdominal pain and blood in stool.   Genitourinary: Negative for hematuria and difficulty urinating.   Musculoskeletal: Negative for myalgias and gait problem.   Skin: Negative for color change and rash.   Neurological: Negative for syncope and light-headedness.   Psychiatric/Behavioral: Negative for confusion and agitation. The patient is not nervous/anxious.  Heme: no bleeding disorders, no melena or hematochezia  All other review of systems are negative     PHYSICAL EXAM:  Constitutional   Vitals:    07/25/24 0832   BP: (!) 152/82   Pulse: 60   Resp: 18   Temp: 97.9 °F (36.6 °C)   SpO2: 96%    Well-developed, no acute distress, well groomed  Eyes: conjunctivae normal, no xanthelasma   Ears, Nose, Throat: oral mucosa moist, no cyanosis   Neck: supple, no JVD, no bruits, no thyromegaly   CV: normal rate, regular rhythm,  no murmurs, rubs, or gallops. PMI is nondisplaced, Peripheral pulses normal including carotid auscultation, no noted aortic bruit, bilateral femoral and pedal pulses are normal in quality  Lungs: clear to auscultation bilaterally, normal respiratory effort without used of accessory muscles, no wheezes  Abdomen: soft, non-tender, bowel sounds present, no masses or hepatomegaly   Extremities: no digital clubbing, no edema   Skin: warm, no rashes   Neuro/Psych: A&O x 3, normal mood and affect  Pacemaker site: clean, dry, intact; no erythema, edema, or drainage     Assessment/plan:  nonvalvular persistent AF sp DCCV x 2 (8/28/20, 9/20/23)  - Initially diagnosed in 2017.  - CHADSVASC = 3 (age, sex).

## 2024-07-25 NOTE — ANESTHESIA POSTPROCEDURE EVALUATION
Department of Anesthesiology  Postprocedure Note    Patient: Charlee Clay  MRN: [id number]  YOB: 1940  Date of evaluation: 7/25/2024    Procedure Summary       Date: 07/25/24 Room / Location: [facility] Cardiac Cath Lab    Anesthesia Start: 1138 Anesthesia Stop: 1152    Procedure: CARDIOVERSION EXTERNAL Diagnosis: Typical atrial flutter (HCC)    Scheduled Providers: Brenda Healy MD Responsible Provider: Brenda Healy MD    Anesthesia Type: MAC ASA Status: 3            Anesthesia Type: No value filed.    Skip Phase I: Skip Score: 10    Skip Phase II:      Anesthesia Post Evaluation    Patient location during evaluation: PACU  Patient participation: complete - patient participated  Level of consciousness: awake and alert  Airway patency: patent  Nausea & Vomiting: no nausea and no vomiting  Cardiovascular status: blood pressure returned to baseline and hemodynamically stable  Respiratory status: acceptable and spontaneous ventilation  Hydration status: euvolemic  Multimodal analgesia pain management approach  Pain management: adequate    No notable events documented.

## 2024-07-25 NOTE — PROGRESS NOTES
Labs drawn earlier this morning were reported as hemolyzed. New order placed, new tube sent to lab, awaiting results.

## 2024-07-25 NOTE — DISCHARGE INSTRUCTIONS
Discharge instructions:  - Medications: no medication changes. Resume all prescribed medications.  - Follow-up:  -- Remote pacemaker check: this will be performed in ~1 week.  -- Dr Moses office appointment: 9/5/24 at 11:30 AM.   - Questions/concerns: please call [phone]

## 2024-07-25 NOTE — PROGRESS NOTES
TRANSFER - OUT REPORT:    Verbal report given to [facility] RN(name) on Charlee Clay being transferred to [facility] Recovery(unit) for change in patient condition (Routine progression of care)       Report consisted of patient's Situation, Background, Assessment and   Recommendations(SBAR).     Information from the following report(s) Nurse Handoff Report was reviewed with the receiving nurse.    Opportunity for questions and clarification was provided.      Patient transported with:   Monitor

## 2024-07-28 LAB
EKG ATRIAL RATE: 70 BPM
EKG Q-T INTERVAL: 516 MS
EKG QRS DURATION: 170 MS
EKG QTC CALCULATION (BAZETT): 516 MS
EKG R AXIS: -53 DEGREES
EKG T AXIS: 117 DEGREES
EKG VENTRICULAR RATE: 60 BPM

## 2024-08-01 ENCOUNTER — TELEPHONE (OUTPATIENT)
Dept: NON INVASIVE DIAGNOSTICS | Age: 84
End: 2024-08-01

## 2024-08-01 NOTE — TELEPHONE ENCOUNTER
----- Message from Aaron Moses DO sent at 7/30/2024  8:38 AM EDT -----  Ok. Will likely change to rate control at follow-up as she is 3# AV block w/ PPM.    -Aaron Moses DO   ----- Message -----  From: Vicki Bond RN  Sent: 7/29/2024  12:07 PM EDT  To: Aaron Moses DO    Please see [facility] alert for AF   Patient had cardioversion on 7.25.2024  And went back into   AF on 7.25.2024 @ 2370

## 2024-08-20 ENCOUNTER — OFFICE VISIT (OUTPATIENT)
Dept: ORTHOPEDIC SURGERY | Age: 84
End: 2024-08-20
Payer: COMMERCIAL

## 2024-08-20 DIAGNOSIS — S32.9XXA: Primary | ICD-10-CM

## 2024-08-20 PROCEDURE — 1036F TOBACCO NON-USER: CPT | Performed by: STUDENT IN AN ORGANIZED HEALTH CARE EDUCATION/TRAINING PROGRAM

## 2024-08-20 PROCEDURE — 1123F ACP DISCUSS/DSCN MKR DOCD: CPT | Performed by: STUDENT IN AN ORGANIZED HEALTH CARE EDUCATION/TRAINING PROGRAM

## 2024-08-20 PROCEDURE — 99213 OFFICE O/P EST LOW 20 MIN: CPT | Performed by: STUDENT IN AN ORGANIZED HEALTH CARE EDUCATION/TRAINING PROGRAM

## 2024-08-20 PROCEDURE — G8417 CALC BMI ABV UP PARAM F/U: HCPCS | Performed by: STUDENT IN AN ORGANIZED HEALTH CARE EDUCATION/TRAINING PROGRAM

## 2024-08-20 PROCEDURE — 1090F PRES/ABSN URINE INCON ASSESS: CPT | Performed by: STUDENT IN AN ORGANIZED HEALTH CARE EDUCATION/TRAINING PROGRAM

## 2024-08-20 PROCEDURE — G8399 PT W/DXA RESULTS DOCUMENT: HCPCS | Performed by: STUDENT IN AN ORGANIZED HEALTH CARE EDUCATION/TRAINING PROGRAM

## 2024-08-20 PROCEDURE — G8427 DOCREV CUR MEDS BY ELIG CLIN: HCPCS | Performed by: STUDENT IN AN ORGANIZED HEALTH CARE EDUCATION/TRAINING PROGRAM

## 2024-08-20 NOTE — PROGRESS NOTES
[facility]  ORTHOPAEDIC SURGERY   DATE OF VISIT: 08/20/24  Follow Up Visit     CHIEF COMPLAINT:   Chief Complaint   Patient presents with    Follow-up     Closed fx of the pelvis    Patient says not having any pain  doing good this time        Charlee Clay returns today for follow up visit regarding pelvis fracture. Treatment thus far has included conservative measures.  She reports symptoms are improved. Patient denies any pain or discomfort, she is ambulating with use of walker. She is attending PT at her facility and is happy with her progress.     Physical Exam:     No data recorded    General: Alert and oriented x3, no acute distress  Psych: mentation normal, non pressured speech   Cardiovascular/pulmonary: No labored breathing, peripheral perfusion intact  Musculoskeletal:    Right lower EXTR  Skin intact  Compartments are compressible  Neurovasc intact otherwise  No pain with internal/external rotation of the hip  No pain with compression of the hip or heel strike  No tenderness palpation about the pelvis    Controlled Substances Monitoring:      Imaging:  XR: AP pelvis as well as inlet outlet view of the involved hip display right-sided superior and inferior pubic rami and sacral ala fracture with interval healing and callus formation      Assessment: Right LC 1 pelvic ring injury       Plan:   WBAT RLE  Continue PT/OT at facility  OTC analgesics prn per facility  Follow up in 8-10 weeks for reevaluation    E/M EST Level 3- Greater than 20 minutes were spent with patient including history, exam, review of imaging (not including taking), discussion of diagnosis and treatment options, answering questions, and documentation.         Fabio Donnelly DO   Orthopaedic Surgery   8/20/24  11:15 AM

## 2024-08-20 NOTE — PATIENT INSTRUCTIONS
INSTRUCTIONS FOR FACILITY      Weight Bearing: Weight bearing as tolerated right lower extremity. Use assistive devices when needed.    Therapy: Continue PT/OT    Pain control: per facility physician      DVT Prophylaxis: Continue with Eliquis at baseline    Follow up: 8-10 weeks    Future Appointments   Date Time Provider Department Center   8/27/2024  9:40 AM Jacqui Hidalgo MD [place] [facility]   9/5/2024 11:30 AM Cindi Haro, APRN - CNP ELECTRO PHYS [facility]   9/5/2024 11:30 AM SCHEDULE, DEVICE CLINIC [facility] [place] ELECTRO PHYS [facility]       Call office with any questions or concerns.

## 2024-08-27 ENCOUNTER — OFFICE VISIT (OUTPATIENT)
Dept: CARDIOLOGY CLINIC | Age: 84
End: 2024-08-27
Payer: COMMERCIAL

## 2024-08-27 VITALS
BODY MASS INDEX: 30.63 KG/M2 | HEART RATE: 60 BPM | DIASTOLIC BLOOD PRESSURE: 76 MMHG | HEIGHT: 57 IN | WEIGHT: 142 LBS | RESPIRATION RATE: 18 BRPM | SYSTOLIC BLOOD PRESSURE: 122 MMHG

## 2024-08-27 DIAGNOSIS — I48.3 TYPICAL ATRIAL FLUTTER (HCC): Primary | ICD-10-CM

## 2024-08-27 PROCEDURE — 93000 ELECTROCARDIOGRAM COMPLETE: CPT | Performed by: INTERNAL MEDICINE

## 2024-08-27 PROCEDURE — 1036F TOBACCO NON-USER: CPT | Performed by: INTERNAL MEDICINE

## 2024-08-27 PROCEDURE — G8427 DOCREV CUR MEDS BY ELIG CLIN: HCPCS | Performed by: INTERNAL MEDICINE

## 2024-08-27 PROCEDURE — G8417 CALC BMI ABV UP PARAM F/U: HCPCS | Performed by: INTERNAL MEDICINE

## 2024-08-27 PROCEDURE — 1123F ACP DISCUSS/DSCN MKR DOCD: CPT | Performed by: INTERNAL MEDICINE

## 2024-08-27 PROCEDURE — 1090F PRES/ABSN URINE INCON ASSESS: CPT | Performed by: INTERNAL MEDICINE

## 2024-08-27 PROCEDURE — 99214 OFFICE O/P EST MOD 30 MIN: CPT | Performed by: INTERNAL MEDICINE

## 2024-08-27 PROCEDURE — G8399 PT W/DXA RESULTS DOCUMENT: HCPCS | Performed by: INTERNAL MEDICINE

## 2024-08-27 RX ORDER — CHOLECALCIFEROL (VITAMIN D3) 50 MCG
1000 TABLET ORAL DAILY
COMMUNITY
Start: 2024-08-07

## 2024-08-27 RX ORDER — CALCIUM CARBONATE 500 MG/1
1 TABLET, CHEWABLE ORAL DAILY
COMMUNITY

## 2024-08-27 NOTE — PATIENT INSTRUCTIONS
Continue Toprol-XL 25 mg p.o. daily for rate control and dose adjusted Eliquis 2.5 mg p.o. twice daily for anticoagulation.  Continue atorvastatin 10 mg p.o. daily  Echo results reviewed, as above and discussed with the patient and with the patient, normal LV function with abnormal diastolic function.   Continue Torsemide 20 mg po daily and potassium 20 meq p.o. daily for peripheral edema.  Continue Jardiance 10 mg p.o. daily for HFpEF.  Pacer evaluation results were reviewed as above, battery life 6 years, A-fib burden 100% %, RV pacing 99%.  Follow-up with the EP service as scheduled for management of atrial fibrillation/flutter  Fall precautions.  Pacer evaluation findings were noted.,  100% A-fib burden, ventricular paced rhythm 99% and battery life 5 years.   Follow-up with me in 6 months.

## 2024-08-27 NOTE — PROGRESS NOTES
OUTPATIENT CARDIOLOGY FOLLOW-UP    Name: Charlee Clay    Age: 84 y.o.    Primary Care Physician: Huber Ardon MD    Date of Service: 8/27/2024    Chief Complaint:   No chief complaint on file.      Interim History:   Ms. Clay is a 84-year-old  female with history of chronic left bundle branch block, syncope secondary to Mobitz type II AV block and left bundle branch block 10/10/2017 permanent pacemaker in situ implanted on 10/12/2017, paroxysmal atrial fibrillation diagnosed on 6/13/2020 with a [id number] of 3 on Eliquis for anticoagulation without any bleeding issues, s/p cardioversion to normal sinus rhythm on 8/28/2020, GERD, radiculopathy, osteoporosis, cancer, here for follow-up visit.  Currently she is living at [facility] assisted living facility.    She was seen in office 3/8/2024, since last visit, she was hospitalized from 6/23/2024 to 6/25/2024 with mechanical fall and was diagnosed with closed pelvic fracture.  She was treated conservatively and underwent PT OT evaluation.  Now she is able to walk with a walker.  Denies any more falls.    She is compliant with medications, as well as salt and fluid intake.  She does not take any over-the-counter arthritis medications.  Currently, lives in an assisted living facility.  Patient on Eliquis for anticoagulation without any bleeding complications.    No new cardiac complaints since last cardiology evaluation except for gait dysfunction and denies any more falls.  Now, she is using walker for ambulation.  Has mild leg edema but feels better with water pills.  She denies recent chest pain, SOB, palpitations, lightheadedness, dizziness, syncope, PND, or orthopnea. Paced rhythm on EKG.    Review of Systems:   Cardiac: As per HPI  General: No fever, chills  Pulmonary: As per HPI  HEENT: No visual disturbances, difficult swallowing  GI: No nausea, vomiting  Endocrine: No thyroid disease or DM  Musculoskeletal: MUÑOZ x 4, no focal motor

## 2024-09-05 ENCOUNTER — OFFICE VISIT (OUTPATIENT)
Dept: NON INVASIVE DIAGNOSTICS | Age: 84
End: 2024-09-05

## 2024-09-05 VITALS
HEIGHT: 56 IN | RESPIRATION RATE: 16 BRPM | BODY MASS INDEX: 32.21 KG/M2 | HEART RATE: 68 BPM | SYSTOLIC BLOOD PRESSURE: 112 MMHG | WEIGHT: 143.2 LBS | DIASTOLIC BLOOD PRESSURE: 72 MMHG

## 2024-09-05 DIAGNOSIS — Z79.01 CHRONIC ANTICOAGULATION: ICD-10-CM

## 2024-09-05 DIAGNOSIS — I48.19 PERSISTENT ATRIAL FIBRILLATION (HCC): Primary | ICD-10-CM

## 2024-09-05 DIAGNOSIS — I44.1 AV BLOCK, 2ND DEGREE: ICD-10-CM

## 2024-09-05 DIAGNOSIS — Z95.0 PACEMAKER: ICD-10-CM

## 2024-09-05 NOTE — PROGRESS NOTES
[facility] Electrophysiology  Outpatient Progress Note  Charlee Clay  1940  Date of Service: 9/5/2024  PCP: Huber Ardon MD  Electrophysiologist: Dr. Moses        Subjective: Charlee Clay is seen for follow-up and management of: pacemaker     Last seen in the office with Cindi Haro, APRN - CNP   on 11/30/2023  DCCV 7/25/2024    PMH as noted below significant for  nonvalvular persistent AF sp DCCV (8/28/20), AFL, 3* AV block sp BSCI dc PPM (DOI: 10/12/17- Dr Harris), SND, suspected ICM, right breast CA sp lumpectomy (2010).  In 2017, she was diagnosed with AV block and SND, which was treated with BSCI dc PPM by Dr Harris. In 2020, she was diagnosed with AF, which was treated with OAC and DCCV (8/28/20).   Patient had recurrent atrial flutter in April 2023 and was cardioverted in September 2023.  She reverted back to AF in December 2023.  She appeared asymptomatic but continued in AF and was cardioverted on 7/25/2024.  She is back in atrial fibrillation today.  On graph report it appears she was only in normal rhythm for about 1 month and converted back to AF at the end of August.  Patient states that she does not feel any different and is able to walk in her assisted living with her walker without trouble.  She states she does not do very much activity but denies KIMBALL when she walks to the bin room.  \  No orthopnea, she does have swelling in her legs which she wears bilateral GLORIA hose for.  She states that her swelling has gotten slightly increased recently.        Prior cardiac testing:  - TTE (9/13/23): AF, LVEF = 60-65%, mild concentric LVH, severe LAE, mild MR, and mild AI.  - TTE (10/13/20): LVEF = 60%, moderate LAE, mild-moderate MR, mild TR, mild AI.  - Pharmacologic Nuclear Stress (10/13/20): LVEF = 56%, basal inferolateral wall ischemia (small area), and no infarct.  - TTE (10/11/17): LVEF = 75%, concentric LVH, stage I LVDD, severe

## 2024-10-10 RX ORDER — EMPAGLIFLOZIN 10 MG/1
TABLET, FILM COATED ORAL
Qty: 31 TABLET | Refills: 11 | Status: SHIPPED | OUTPATIENT
Start: 2024-10-10

## 2024-10-15 ENCOUNTER — OFFICE VISIT (OUTPATIENT)
Dept: ORTHOPEDIC SURGERY | Age: 84
End: 2024-10-15
Payer: COMMERCIAL

## 2024-10-15 DIAGNOSIS — S32.9XXA: Primary | ICD-10-CM

## 2024-10-15 PROCEDURE — 1090F PRES/ABSN URINE INCON ASSESS: CPT | Performed by: STUDENT IN AN ORGANIZED HEALTH CARE EDUCATION/TRAINING PROGRAM

## 2024-10-15 PROCEDURE — G8399 PT W/DXA RESULTS DOCUMENT: HCPCS | Performed by: STUDENT IN AN ORGANIZED HEALTH CARE EDUCATION/TRAINING PROGRAM

## 2024-10-15 PROCEDURE — G8417 CALC BMI ABV UP PARAM F/U: HCPCS | Performed by: STUDENT IN AN ORGANIZED HEALTH CARE EDUCATION/TRAINING PROGRAM

## 2024-10-15 PROCEDURE — 99213 OFFICE O/P EST LOW 20 MIN: CPT | Performed by: STUDENT IN AN ORGANIZED HEALTH CARE EDUCATION/TRAINING PROGRAM

## 2024-10-15 PROCEDURE — G8484 FLU IMMUNIZE NO ADMIN: HCPCS | Performed by: STUDENT IN AN ORGANIZED HEALTH CARE EDUCATION/TRAINING PROGRAM

## 2024-10-15 PROCEDURE — 1036F TOBACCO NON-USER: CPT | Performed by: STUDENT IN AN ORGANIZED HEALTH CARE EDUCATION/TRAINING PROGRAM

## 2024-10-15 PROCEDURE — G8427 DOCREV CUR MEDS BY ELIG CLIN: HCPCS | Performed by: STUDENT IN AN ORGANIZED HEALTH CARE EDUCATION/TRAINING PROGRAM

## 2024-10-15 PROCEDURE — 1123F ACP DISCUSS/DSCN MKR DOCD: CPT | Performed by: STUDENT IN AN ORGANIZED HEALTH CARE EDUCATION/TRAINING PROGRAM

## 2024-10-15 NOTE — PROGRESS NOTES
[facility] [place]   ORTHOPAEDIC SURGERY   DATE OF VISIT: 10/15/24  Follow Up Visit     CHIEF COMPLAINT:   Chief Complaint   Patient presents with    Follow-up     Follow up to multiple pelvic fractures. Having lower back pain that radiates around the hip       Subjective:    Charlee Clay is here for follow up visit s/p pelvic fracture. Treatment thus far has included conservative measures. She is doing well. She has been WBAT and ambulating with use of walker. Patient does c/o mild intermittent pain to pelvis which she takes tylenol for with good relief.    Controlled Substances Monitoring:        Physical Exam:    No data recorded    General: Alert and oriented x3, no acute distress  Cardiovascular/pulmonary: No labored breathing, peripheral perfusion intact  Musculoskeletal:    Right lower EXTR  Skin intact  Compartments are soft and compressible  Neurovasc intact otherwise  No pain with internal/external rotation of the hip  No pain with compression of the hip or heel strike  No tenderness palpation about the pelvis    Imaging: XR: AP pelvis as well as inlet outlet view of the involved hip display right-sided superior and inferior pubic rami and sacral ala fracture with interval healing and callus formation     Assessment and Plan:  Right LC 1 pelvic ring injury     Continue with activities as tolerated.  PT OT as able at the facility.  Follow-up in 3 months for repeat evaluation x-rays at that point time    E/M EST Level 3- Greater than 20 minutes were spent with patient including history, exam, review of imaging (not including taking), discussion of diagnosis and treatment options, answering questions, and documentation.         Fabio Donnelly D.O.  Orthopedic Surgery  10/15/24  10:14 AM

## 2024-10-15 NOTE — PATIENT INSTRUCTIONS
INSTRUCTIONS FOR FACILITY      Weight Bearing: Weight bearing as tolerated right lower extremity. Use assistive devices when needed.    Therapy: Continue PT/OT    Pain control: per facility physician    DVT Prophylaxis: Continue with Eliquis per primary    Follow up: 3 months    No future appointments.    Call office with any questions or concerns.

## 2024-10-23 ENCOUNTER — TELEPHONE (OUTPATIENT)
Dept: ORTHOPEDIC SURGERY | Age: 84
End: 2024-10-23

## 2024-10-23 DIAGNOSIS — S32.9XXA: Primary | ICD-10-CM

## 2024-11-08 RX ORDER — TORSEMIDE 20 MG/1
20 TABLET ORAL DAILY
Qty: 90 TABLET | Refills: 3 | Status: SHIPPED | OUTPATIENT
Start: 2024-11-08

## 2025-01-14 ENCOUNTER — OFFICE VISIT (OUTPATIENT)
Dept: ORTHOPEDIC SURGERY | Age: 85
End: 2025-01-14
Payer: COMMERCIAL

## 2025-01-14 VITALS
DIASTOLIC BLOOD PRESSURE: 70 MMHG | TEMPERATURE: 98.1 F | SYSTOLIC BLOOD PRESSURE: 127 MMHG | BODY MASS INDEX: 32.17 KG/M2 | HEIGHT: 56 IN | HEART RATE: 59 BPM | RESPIRATION RATE: 22 BRPM | WEIGHT: 143 LBS | OXYGEN SATURATION: 97 %

## 2025-01-14 DIAGNOSIS — S32.9XXA: Primary | ICD-10-CM

## 2025-01-14 PROCEDURE — 99213 OFFICE O/P EST LOW 20 MIN: CPT | Performed by: STUDENT IN AN ORGANIZED HEALTH CARE EDUCATION/TRAINING PROGRAM

## 2025-01-14 PROCEDURE — 1159F MED LIST DOCD IN RCRD: CPT | Performed by: STUDENT IN AN ORGANIZED HEALTH CARE EDUCATION/TRAINING PROGRAM

## 2025-01-14 PROCEDURE — G8427 DOCREV CUR MEDS BY ELIG CLIN: HCPCS | Performed by: STUDENT IN AN ORGANIZED HEALTH CARE EDUCATION/TRAINING PROGRAM

## 2025-01-14 PROCEDURE — 1090F PRES/ABSN URINE INCON ASSESS: CPT | Performed by: STUDENT IN AN ORGANIZED HEALTH CARE EDUCATION/TRAINING PROGRAM

## 2025-01-14 PROCEDURE — G8399 PT W/DXA RESULTS DOCUMENT: HCPCS | Performed by: STUDENT IN AN ORGANIZED HEALTH CARE EDUCATION/TRAINING PROGRAM

## 2025-01-14 PROCEDURE — 1036F TOBACCO NON-USER: CPT | Performed by: STUDENT IN AN ORGANIZED HEALTH CARE EDUCATION/TRAINING PROGRAM

## 2025-01-14 PROCEDURE — 1123F ACP DISCUSS/DSCN MKR DOCD: CPT | Performed by: STUDENT IN AN ORGANIZED HEALTH CARE EDUCATION/TRAINING PROGRAM

## 2025-01-14 PROCEDURE — G8417 CALC BMI ABV UP PARAM F/U: HCPCS | Performed by: STUDENT IN AN ORGANIZED HEALTH CARE EDUCATION/TRAINING PROGRAM

## 2025-01-14 NOTE — PROGRESS NOTES
[facility]  ORTHOPAEDICS    Follow Up Visit     CHIEF COMPLAINT:   Chief Complaint   Patient presents with    Fracture     3 month f/u Right junctional superior pubic rami fracture, right inferior. Rami fracture  ·Right zone 2 fracture of sacrum into S1 foramen  ·Right shoulder pain, osteoarthritis   Still bothered by lower back pain but otherwise doing well with pelvic fractures.            Charlee Clay returns today for follow up visit regarding right LC 1 pelvic ring injury overall doing very well.  She is ambulating with a walker and happy with her progress    Physical Exam:     Height: 1.41 m (4' 7.5\"), Weight - Scale: 64.9 kg (143 lb), BP: 127/70    General: Alert and oriented x3, no acute distress  Cardiovascular/pulmonary: No labored breathing, peripheral perfusion intact  Musculoskeletal:    Physical Exam  Right lower extremity  Skin intact  Compartments are compressible  Neurovasc intact otherwise  No pain with internal extra rotation hip  No pain with heel strike or compression of the hip  No tense palpation about the pelvis      Controlled Substances Monitoring:      Imaging:    Results  Imaging  X-rays reviewed today of the pelvis small views: Demonstrate healing right LC 1 pelvic ring injury              Assesment/Plan:     Assessment & Plan  Right LC 1 pelvic ring injury    The x-ray results indicate that the fracture has successfully healed. He will continue taking Tylenol as needed for pain management. A follow-up appointment has been scheduled for 6 months from now for a final check-up. An additional x-ray will be conducted closer to the one-year chauncey to ensure everything remains in good condition. He is advised to contact the office if any issues arise.    Follow-up  The patient will follow up in 6 months.      Fabio Donnelly,   Orthopaedic Surgery   1/14/25  11:24 AM

## 2025-02-06 ENCOUNTER — OFFICE VISIT (OUTPATIENT)
Dept: CARDIOLOGY CLINIC | Age: 85
End: 2025-02-06
Payer: COMMERCIAL

## 2025-02-06 VITALS
WEIGHT: 147.5 LBS | TEMPERATURE: 97.3 F | HEIGHT: 56 IN | BODY MASS INDEX: 33.18 KG/M2 | HEART RATE: 61 BPM | RESPIRATION RATE: 16 BRPM | SYSTOLIC BLOOD PRESSURE: 112 MMHG | OXYGEN SATURATION: 91 % | DIASTOLIC BLOOD PRESSURE: 76 MMHG

## 2025-02-06 DIAGNOSIS — I49.5 SSS (SICK SINUS SYNDROME) (HCC): Primary | ICD-10-CM

## 2025-02-06 DIAGNOSIS — E78.00 PURE HYPERCHOLESTEROLEMIA: ICD-10-CM

## 2025-02-06 DIAGNOSIS — I50.33 ACUTE ON CHRONIC HEART FAILURE WITH PRESERVED EJECTION FRACTION (HFPEF) (HCC): ICD-10-CM

## 2025-02-06 PROCEDURE — G8417 CALC BMI ABV UP PARAM F/U: HCPCS | Performed by: INTERNAL MEDICINE

## 2025-02-06 PROCEDURE — 1123F ACP DISCUSS/DSCN MKR DOCD: CPT | Performed by: INTERNAL MEDICINE

## 2025-02-06 PROCEDURE — G8427 DOCREV CUR MEDS BY ELIG CLIN: HCPCS | Performed by: INTERNAL MEDICINE

## 2025-02-06 PROCEDURE — 1160F RVW MEDS BY RX/DR IN RCRD: CPT | Performed by: INTERNAL MEDICINE

## 2025-02-06 PROCEDURE — G8399 PT W/DXA RESULTS DOCUMENT: HCPCS | Performed by: INTERNAL MEDICINE

## 2025-02-06 PROCEDURE — 93000 ELECTROCARDIOGRAM COMPLETE: CPT | Performed by: INTERNAL MEDICINE

## 2025-02-06 PROCEDURE — 1090F PRES/ABSN URINE INCON ASSESS: CPT | Performed by: INTERNAL MEDICINE

## 2025-02-06 PROCEDURE — 99214 OFFICE O/P EST MOD 30 MIN: CPT | Performed by: INTERNAL MEDICINE

## 2025-02-06 PROCEDURE — 1036F TOBACCO NON-USER: CPT | Performed by: INTERNAL MEDICINE

## 2025-02-06 PROCEDURE — 1159F MED LIST DOCD IN RCRD: CPT | Performed by: INTERNAL MEDICINE

## 2025-02-06 RX ORDER — TRIAMCINOLONE ACETONIDE 1 MG/G
CREAM TOPICAL
COMMUNITY
Start: 2024-12-24

## 2025-02-06 RX ORDER — POTASSIUM CHLORIDE 1500 MG/1
20 TABLET, EXTENDED RELEASE ORAL 2 TIMES DAILY
Qty: 60 TABLET | Refills: 5 | Status: SHIPPED | OUTPATIENT
Start: 2025-02-06 | End: 2025-02-06 | Stop reason: SDUPTHER

## 2025-02-06 RX ORDER — POTASSIUM CHLORIDE 1500 MG/1
20 TABLET, EXTENDED RELEASE ORAL 2 TIMES DAILY
Qty: 60 TABLET | Refills: 5 | Status: SHIPPED | OUTPATIENT
Start: 2025-02-06

## 2025-02-06 RX ORDER — TORSEMIDE 20 MG/1
20 TABLET ORAL 2 TIMES DAILY
Qty: 180 TABLET | Refills: 4 | Status: SHIPPED | OUTPATIENT
Start: 2025-02-06

## 2025-02-06 RX ORDER — ACETAMINOPHEN 160 MG
2000 TABLET,DISINTEGRATING ORAL DAILY
COMMUNITY

## 2025-02-06 RX ORDER — TORSEMIDE 20 MG/1
20 TABLET ORAL 2 TIMES DAILY
Qty: 180 TABLET | Refills: 4 | Status: SHIPPED | OUTPATIENT
Start: 2025-02-06 | End: 2025-02-06 | Stop reason: SDUPTHER

## 2025-02-06 NOTE — PROGRESS NOTES
normal sinus rhythm 8/28/2020 and again on 7/25/2024, back in atrial flutter with controlled rate.  Pacer evaluation showed 100% atrial fibrillation/flutter>> remains in flutter  History of syncope secondary to left bundle branch block and Mobitz type II block, status post DDD pacer in situ 10/12/2017  [id number] score 3 on dose adjusted Eliquis 2.5 mg twice daily  Chronic left bundle branch block.  Acute on chronic HFpEF, appears volume overloaded  VHD: mild AI/MR, RVSP 36 mmHg.  GERD  Radiculopathy, osteoporosis and history of cancer  Gait dysfunction and falls, denies any further falls.      Plan:   Continue Toprol-XL 25 mg p.o. daily for rate control and dose adjusted Eliquis 2.5 mg p.o. twice daily for anticoagulation.  Continue atorvastatin 10 mg p.o. daily, advised to get another fasting lipid profile with the next blood work.  Echo results reviewed, as above and discussed with the patient and with the patient, normal LV function with abnormal diastolic function.   Patient remains volume overloaded.  I would recommend changing torsemide to 20 mg in a.m. and another 20 mg around 3 PM.  Increase potassium to 20 mg p.o. twice daily.  Get a BMP and BNP in 1 week.  Continue Jardiance 10 mg p.o. daily for HFpEF.  Pacer evaluation results were reviewed as above, battery life 5 years, A-fib burden 95% %, RV pacing 96% and atrial pacing 1%.  Follow-up with the EP service as scheduled for management of atrial fibrillation/flutter  Fall precautions.  Pacer evaluation findings were noted.,  100% A-fib burden, ventricular paced rhythm 99% and battery life 5 years.   Follow-up with me in 3 months.    The patient's current medication list, allergies, problem list and results of all previously ordered testing were reviewed at today's visit.  Jacqui Hidalgo MD  [facility] Cardiology

## 2025-02-06 NOTE — PATIENT INSTRUCTIONS
Continue Toprol-XL 25 mg p.o. daily for rate control and dose adjusted Eliquis 2.5 mg p.o. twice daily for anticoagulation.  Continue atorvastatin 10 mg p.o. daily, advised to get another fasting lipid profile with the next blood work.  Echo results reviewed, as above and discussed with the patient and with the patient, normal LV function with abnormal diastolic function.   Patient remains volume overloaded.  I would recommend changing torsemide to 20 mg in a.m. and another 20 mg around 3 PM.  Increase potassium to 20 mg p.o. twice daily.  Get a BMP and BNP in 1 week.  Continue Jardiance 10 mg p.o. daily for HFpEF.  Pacer evaluation results were reviewed as above, battery life 5 years, A-fib burden 95% %, RV pacing 96% and atrial pacing 1%.  Follow-up with the EP service as scheduled for management of atrial fibrillation/flutter  Fall precautions.  Pacer evaluation findings were noted.,  100% A-fib burden, ventricular paced rhythm 99% and battery life 5 years.   Follow-up with me in 3 months.

## 2025-02-13 ENCOUNTER — TELEPHONE (OUTPATIENT)
Dept: CARDIOLOGY CLINIC | Age: 85
End: 2025-02-13

## 2025-02-13 DIAGNOSIS — E78.00 PURE HYPERCHOLESTEROLEMIA: ICD-10-CM

## 2025-02-13 DIAGNOSIS — I50.33 ACUTE ON CHRONIC HEART FAILURE WITH PRESERVED EJECTION FRACTION (HFPEF) (HCC): ICD-10-CM

## 2025-02-13 NOTE — TELEPHONE ENCOUNTER
----- Message from Dr. Jacqui Hidalgo MD sent at 2/13/2025  2:59 PM EST -----  Her cholesterol panel is well-controlled.

## 2025-05-01 ENCOUNTER — OFFICE VISIT (OUTPATIENT)
Dept: CARDIOLOGY CLINIC | Age: 85
End: 2025-05-01
Payer: COMMERCIAL

## 2025-05-01 ENCOUNTER — TELEPHONE (OUTPATIENT)
Dept: CARDIOLOGY CLINIC | Age: 85
End: 2025-05-01

## 2025-05-01 VITALS
DIASTOLIC BLOOD PRESSURE: 72 MMHG | BODY MASS INDEX: 33.09 KG/M2 | WEIGHT: 147.1 LBS | OXYGEN SATURATION: 95 % | HEART RATE: 63 BPM | TEMPERATURE: 96.2 F | HEIGHT: 56 IN | RESPIRATION RATE: 18 BRPM | SYSTOLIC BLOOD PRESSURE: 118 MMHG

## 2025-05-01 DIAGNOSIS — I48.19 PERSISTENT ATRIAL FIBRILLATION (HCC): ICD-10-CM

## 2025-05-01 DIAGNOSIS — I48.91 ATRIAL FIBRILLATION, UNSPECIFIED TYPE (HCC): Primary | ICD-10-CM

## 2025-05-01 DIAGNOSIS — I50.32 CHRONIC HEART FAILURE WITH PRESERVED EJECTION FRACTION (HFPEF) (HCC): ICD-10-CM

## 2025-05-01 PROCEDURE — G8417 CALC BMI ABV UP PARAM F/U: HCPCS | Performed by: INTERNAL MEDICINE

## 2025-05-01 PROCEDURE — 1159F MED LIST DOCD IN RCRD: CPT | Performed by: INTERNAL MEDICINE

## 2025-05-01 PROCEDURE — 1090F PRES/ABSN URINE INCON ASSESS: CPT | Performed by: INTERNAL MEDICINE

## 2025-05-01 PROCEDURE — 1036F TOBACCO NON-USER: CPT | Performed by: INTERNAL MEDICINE

## 2025-05-01 PROCEDURE — G2211 COMPLEX E/M VISIT ADD ON: HCPCS | Performed by: INTERNAL MEDICINE

## 2025-05-01 PROCEDURE — G8399 PT W/DXA RESULTS DOCUMENT: HCPCS | Performed by: INTERNAL MEDICINE

## 2025-05-01 PROCEDURE — 99214 OFFICE O/P EST MOD 30 MIN: CPT | Performed by: INTERNAL MEDICINE

## 2025-05-01 PROCEDURE — 93000 ELECTROCARDIOGRAM COMPLETE: CPT | Performed by: INTERNAL MEDICINE

## 2025-05-01 PROCEDURE — G8427 DOCREV CUR MEDS BY ELIG CLIN: HCPCS | Performed by: INTERNAL MEDICINE

## 2025-05-01 PROCEDURE — 1123F ACP DISCUSS/DSCN MKR DOCD: CPT | Performed by: INTERNAL MEDICINE

## 2025-05-01 NOTE — PATIENT INSTRUCTIONS
Continue Toprol-XL 25 mg p.o. daily for rate control and  patient on dose adjusted Eliquis 2.5 mg p.o. twice daily for anticoagulation , based on her weight and creatinine patient should be on 5 mg twice daily, will change it to 5 mg po twice a day.  Continue atorvastatin 10 mg p.o. daily, recent lipid panel done on 2/13/2025 were reviewed, total cholesterol 144, triglycerides 82, LDL 77 HDL 51, well-controlled  Echo results reviewed, as above and discussed with the patient and with the patient, normal LV function with abnormal diastolic function.   Continue torsemide 20 mg p.o. twice daily with potassium supplements and Jardiance 10 mg p.o. daily.  Pacer evaluation results were reviewed as above, battery life 4.5 years, A-fib burden 95% , RV pacing 97% and atrial pacing 1%.  Follow-up with the EP service as scheduled for management of atrial fibrillation/flutter  Fall precautions.  Follow-up with me in 6 months.

## 2025-05-01 NOTE — TELEPHONE ENCOUNTER
I did not tell the patient about the need to discuss with family. She might have misunderstood/did not hear properly.  She is stable but still has leg edema,  I did increase Eliquis to 5 mg twice a day based on her weight and renal functions.

## 2025-05-01 NOTE — PROGRESS NOTES
OUTPATIENT CARDIOLOGY FOLLOW-UP    Name: Charlee Clay    Age: 84 y.o.    Primary Care Physician: Huber Ardon MD    Date of Service: 5/1/2025    Chief Complaint:   Chief Complaint   Patient presents with    Follow-up     3 month ov    Shortness of Breath       Interim History:   Ms. Clya is a 84-year-old  female with history of chronic left bundle branch block, syncope secondary to Mobitz type II AV block and left bundle branch block 10/10/2017 permanent pacemaker in situ implanted on 10/12/2017, paroxysmal atrial fibrillation diagnosed on 6/13/2020 with a [id number] of 3 on Eliquis for anticoagulation without any bleeding issues, s/p cardioversion to normal sinus rhythm on 8/28/2020, GERD, radiculopathy, osteoporosis, cancer, here for follow-up visit.  Currently she is living at [facility] assisted living facility.    She was seen in office 2/6/2025, since last visit, she has not any further hospitalizations or ER visits.  She was hospitalized from 6/23/2024 to 6/25/2024 with mechanical fall and was diagnosed with closed pelvic fracture.  She was treated conservatively and underwent PT OT evaluation.  Now she is able to walk with a walker.  Denies any more falls.    She is compliant with medications, as well as salt and fluid intake.  She does not take any over-the-counter arthritis medications.  Currently, lives in an assisted living facility.  Patient on Eliquis for anticoagulation without any bleeding complications.    No new cardiac complaints since last cardiology evaluation except for gait dysfunction and denies any more falls.  Developed skin rash on her hands secondary to hand soap.   Now, she is using walker for ambulation.  Has mild leg edema but feels better with water pills still has significant swelling.  She denies recent chest pain, SOB, palpitations, syncope, PND, or orthopnea. Gets slight dizziness on getting up for only few seconds.  Paced rhythm on EKG.    Review of

## 2025-05-01 NOTE — TELEPHONE ENCOUNTER
Patient's niece (Jessenia) called stating she was told to call to speak with Dr. Hidalgo regarding patient's appointment today. [phone]

## 2025-05-17 PROCEDURE — 93296 REM INTERROG EVL PM/IDS: CPT | Performed by: STUDENT IN AN ORGANIZED HEALTH CARE EDUCATION/TRAINING PROGRAM

## 2025-05-17 PROCEDURE — 93294 REM INTERROG EVL PM/LDLS PM: CPT | Performed by: STUDENT IN AN ORGANIZED HEALTH CARE EDUCATION/TRAINING PROGRAM

## 2025-07-15 ENCOUNTER — OFFICE VISIT (OUTPATIENT)
Dept: ORTHOPEDIC SURGERY | Age: 85
End: 2025-07-15

## 2025-07-15 VITALS
WEIGHT: 147 LBS | SYSTOLIC BLOOD PRESSURE: 131 MMHG | TEMPERATURE: 97.3 F | BODY MASS INDEX: 33.07 KG/M2 | HEART RATE: 60 BPM | HEIGHT: 56 IN | DIASTOLIC BLOOD PRESSURE: 65 MMHG | OXYGEN SATURATION: 93 % | RESPIRATION RATE: 16 BRPM

## 2025-07-15 DIAGNOSIS — S32.9XXA: ICD-10-CM

## 2025-07-15 NOTE — PROGRESS NOTES
[facility]  ORTHOPAEDICS    Follow Up Visit     CHIEF COMPLAINT:   Chief Complaint   Patient presents with    Follow-up     6 mo f/u Right junctional superior pubic rami fracture, right inferior         Charlee M Obed returns today for follow up visit regarding right LC 1 pelvic ring injury. Treatment thus far has included nonoperative management with weightbearing as tolerated.  She reports symptoms are completely resolved and she is back to her regular baseline and happy with her progress.     History of Present Illness  The patient presents for evaluation of a hip injury.    She reports significant improvement in her hip condition, with increased mobility allowing her to walk more freely. However, she experiences severe dizziness upon rising from bed.    Physical Exam:     Height: 1.422 m (4' 8\"), Weight - Scale: 66.7 kg (147 lb), BP: 131/65    General: Alert and oriented x3, no acute distress  Cardiovascular/pulmonary: No labored breathing, peripheral perfusion intact  Musculoskeletal:    Physical Exam  Right lower extremity  Skin intact  Compartments are compressible  Neurovasc intact otherwise  No pain with internal external rotation hip  No pain with heel strike or compression of the hip  No tenderness to palpation about the pelvis      Controlled Substances Monitoring:      Imaging:    Results  Imaging  X-rays show healed LC1 pelvic ring injury, fracture in sacrum, and fracture in pubic rami with good bony callus.              Assesment/Plan:     Assessment & Plan  1.  Right LC 1 pelvic ring injury, healed  Her hip injury has shown significant improvement, with the x-ray revealing complete healing of the LC1 pelvic ring injury, sacral fracture, and pubic rami fracture. A robust bony callus is also evident. She is advised to contact us if any issues arise.          Fabio Donnelly, DO  Orthopaedic Surgery   7/15/25  10:11 AM

## 2025-08-17 PROCEDURE — 93296 REM INTERROG EVL PM/IDS: CPT | Performed by: STUDENT IN AN ORGANIZED HEALTH CARE EDUCATION/TRAINING PROGRAM

## 2025-08-17 PROCEDURE — 93294 REM INTERROG EVL PM/LDLS PM: CPT | Performed by: STUDENT IN AN ORGANIZED HEALTH CARE EDUCATION/TRAINING PROGRAM
